# Patient Record
Sex: FEMALE | Race: BLACK OR AFRICAN AMERICAN | NOT HISPANIC OR LATINO | Employment: FULL TIME | URBAN - METROPOLITAN AREA
[De-identification: names, ages, dates, MRNs, and addresses within clinical notes are randomized per-mention and may not be internally consistent; named-entity substitution may affect disease eponyms.]

---

## 2018-03-21 ENCOUNTER — ANESTHESIA EVENT (OUTPATIENT)
Dept: SURGERY | Facility: HOSPITAL | Age: 48
End: 2018-03-21
Payer: COMMERCIAL

## 2018-03-21 ENCOUNTER — HOSPITAL ENCOUNTER (OUTPATIENT)
Facility: HOSPITAL | Age: 48
Discharge: HOME | End: 2018-03-21
Attending: OPHTHALMOLOGY | Admitting: OPHTHALMOLOGY
Payer: COMMERCIAL

## 2018-03-21 ENCOUNTER — OFFICE VISIT (OUTPATIENT)
Dept: OPHTHALMOLOGY | Facility: CLINIC | Age: 48
End: 2018-03-21
Payer: COMMERCIAL

## 2018-03-21 ENCOUNTER — ANESTHESIA (OUTPATIENT)
Dept: SURGERY | Facility: HOSPITAL | Age: 48
End: 2018-03-21
Payer: COMMERCIAL

## 2018-03-21 ENCOUNTER — TELEPHONE (OUTPATIENT)
Dept: OPHTHALMOLOGY | Facility: CLINIC | Age: 48
End: 2018-03-21

## 2018-03-21 VITALS
RESPIRATION RATE: 18 BRPM | TEMPERATURE: 99 F | HEIGHT: 61 IN | SYSTOLIC BLOOD PRESSURE: 139 MMHG | OXYGEN SATURATION: 100 % | WEIGHT: 149 LBS | HEART RATE: 74 BPM | DIASTOLIC BLOOD PRESSURE: 86 MMHG | BODY MASS INDEX: 28.13 KG/M2

## 2018-03-21 DIAGNOSIS — H33.011 RETINAL DETACHMENT OF RIGHT EYE WITH SINGLE BREAK: ICD-10-CM

## 2018-03-21 DIAGNOSIS — H33.011 RETINAL DETACHMENT OF RIGHT EYE WITH SINGLE BREAK: Primary | ICD-10-CM

## 2018-03-21 DIAGNOSIS — H27.01 APHAKIA, RIGHT: ICD-10-CM

## 2018-03-21 DIAGNOSIS — H53.001 AMBLYOPIA, RIGHT: ICD-10-CM

## 2018-03-21 DIAGNOSIS — H40.051 OCULAR HYPERTENSION, RIGHT: ICD-10-CM

## 2018-03-21 DIAGNOSIS — H33.001 RHEGMATOGENOUS RETINAL DETACHMENT OF RIGHT EYE: Primary | ICD-10-CM

## 2018-03-21 LAB
B-HCG UR QL: NEGATIVE
CTP QC/QA: YES

## 2018-03-21 PROCEDURE — D9220A PRA ANESTHESIA: Mod: CRNA,,, | Performed by: NURSE ANESTHETIST, CERTIFIED REGISTERED

## 2018-03-21 PROCEDURE — C1784 OCULAR DEV, INTRAOP, DET RET: HCPCS | Performed by: OPHTHALMOLOGY

## 2018-03-21 PROCEDURE — 92020 GONIOSCOPY: CPT | Mod: S$GLB,,, | Performed by: OPHTHALMOLOGY

## 2018-03-21 PROCEDURE — 25000003 PHARM REV CODE 250: Performed by: OPHTHALMOLOGY

## 2018-03-21 PROCEDURE — 25000003 PHARM REV CODE 250

## 2018-03-21 PROCEDURE — 99999 PR PBB SHADOW E&M-EST. PATIENT-LVL II: CPT | Mod: PBBFAC,,, | Performed by: OPHTHALMOLOGY

## 2018-03-21 PROCEDURE — 67108 REPAIR DETACHED RETINA: CPT | Mod: RT,,, | Performed by: OPHTHALMOLOGY

## 2018-03-21 PROCEDURE — D9220A PRA ANESTHESIA: Mod: ANES,,, | Performed by: ANESTHESIOLOGY

## 2018-03-21 PROCEDURE — 37000008 HC ANESTHESIA 1ST 15 MINUTES: Performed by: OPHTHALMOLOGY

## 2018-03-21 PROCEDURE — 36000706: Performed by: OPHTHALMOLOGY

## 2018-03-21 PROCEDURE — 27201423 OPTIME MED/SURG SUP & DEVICES STERILE SUPPLY: Performed by: OPHTHALMOLOGY

## 2018-03-21 PROCEDURE — 63600175 PHARM REV CODE 636 W HCPCS: Performed by: NURSE ANESTHETIST, CERTIFIED REGISTERED

## 2018-03-21 PROCEDURE — 81025 URINE PREGNANCY TEST: CPT | Performed by: OPHTHALMOLOGY

## 2018-03-21 PROCEDURE — 71000015 HC POSTOP RECOV 1ST HR: Performed by: OPHTHALMOLOGY

## 2018-03-21 PROCEDURE — 92225 PR SPECIAL EYE EXAM, INITIAL: CPT | Mod: RT,S$GLB,, | Performed by: OPHTHALMOLOGY

## 2018-03-21 PROCEDURE — 99499 UNLISTED E&M SERVICE: CPT | Mod: ,,, | Performed by: OPHTHALMOLOGY

## 2018-03-21 PROCEDURE — 27600004 OPTIME MED/SURG SUP & DEVICES INTRAOCULAR LENS: Performed by: OPHTHALMOLOGY

## 2018-03-21 PROCEDURE — 92004 COMPRE OPH EXAM NEW PT 1/>: CPT | Mod: S$GLB,,, | Performed by: OPHTHALMOLOGY

## 2018-03-21 PROCEDURE — 37000009 HC ANESTHESIA EA ADD 15 MINS: Performed by: OPHTHALMOLOGY

## 2018-03-21 PROCEDURE — 36000707: Performed by: OPHTHALMOLOGY

## 2018-03-21 PROCEDURE — S0020 INJECTION, BUPIVICAINE HYDRO: HCPCS | Performed by: OPHTHALMOLOGY

## 2018-03-21 PROCEDURE — 63600175 PHARM REV CODE 636 W HCPCS: Performed by: OPHTHALMOLOGY

## 2018-03-21 RX ORDER — MOXIFLOXACIN 5 MG/ML
1 SOLUTION/ DROPS OPHTHALMIC
Status: DISCONTINUED | OUTPATIENT
Start: 2018-03-21 | End: 2018-03-21 | Stop reason: HOSPADM

## 2018-03-21 RX ORDER — CYCLOPENTOLATE HYDROCHLORIDE 10 MG/ML
1 SOLUTION/ DROPS OPHTHALMIC
Status: DISCONTINUED | OUTPATIENT
Start: 2018-03-21 | End: 2018-03-21 | Stop reason: HOSPADM

## 2018-03-21 RX ORDER — NAPROXEN 500 MG/1
500 TABLET ORAL 2 TIMES DAILY
Refills: 3 | COMMUNITY
Start: 2018-02-05 | End: 2022-03-30 | Stop reason: CLARIF

## 2018-03-21 RX ORDER — LIDOCAINE HYDROCHLORIDE 20 MG/ML
INJECTION, SOLUTION EPIDURAL; INFILTRATION; INTRACAUDAL; PERINEURAL
Status: DISCONTINUED
Start: 2018-03-21 | End: 2018-03-21 | Stop reason: HOSPADM

## 2018-03-21 RX ORDER — DEXAMETHASONE SODIUM PHOSPHATE 4 MG/ML
INJECTION, SOLUTION INTRA-ARTICULAR; INTRALESIONAL; INTRAMUSCULAR; INTRAVENOUS; SOFT TISSUE
Status: DISCONTINUED
Start: 2018-03-21 | End: 2018-03-21 | Stop reason: HOSPADM

## 2018-03-21 RX ORDER — TROPICAMIDE 10 MG/ML
1 SOLUTION/ DROPS OPHTHALMIC
Status: CANCELLED | OUTPATIENT
Start: 2018-03-21

## 2018-03-21 RX ORDER — TIMOLOL MALEATE 5 MG/ML
SOLUTION/ DROPS OPHTHALMIC
Refills: 0 | COMMUNITY
Start: 2018-03-20 | End: 2022-03-31

## 2018-03-21 RX ORDER — PREDNISOLONE ACETATE 10 MG/ML
SUSPENSION/ DROPS OPHTHALMIC
Status: COMPLETED
Start: 2018-03-21 | End: 2018-03-21

## 2018-03-21 RX ORDER — NEOMYCIN SULFATE, POLYMYXIN B SULFATE, AND DEXAMETHASONE 3.5; 10000; 1 MG/G; [USP'U]/G; MG/G
OINTMENT OPHTHALMIC
Status: DISCONTINUED | OUTPATIENT
Start: 2018-03-21 | End: 2018-03-21 | Stop reason: HOSPADM

## 2018-03-21 RX ORDER — SODIUM CHLORIDE 9 MG/ML
INJECTION, SOLUTION INTRAVENOUS CONTINUOUS
Status: DISCONTINUED | OUTPATIENT
Start: 2018-03-21 | End: 2018-03-21 | Stop reason: HOSPADM

## 2018-03-21 RX ORDER — ACETAMINOPHEN 325 MG/1
650 TABLET ORAL EVERY 4 HOURS PRN
Status: DISCONTINUED | OUTPATIENT
Start: 2018-03-21 | End: 2018-03-21 | Stop reason: HOSPADM

## 2018-03-21 RX ORDER — LIDOCAINE HYDROCHLORIDE 20 MG/ML
INJECTION, SOLUTION EPIDURAL; INFILTRATION; INTRACAUDAL; PERINEURAL
Status: DISCONTINUED | OUTPATIENT
Start: 2018-03-21 | End: 2018-03-21 | Stop reason: HOSPADM

## 2018-03-21 RX ORDER — NEOMYCIN SULFATE, POLYMYXIN B SULFATE, AND DEXAMETHASONE 3.5; 10000; 1 MG/G; [USP'U]/G; MG/G
OINTMENT OPHTHALMIC
Status: DISCONTINUED
Start: 2018-03-21 | End: 2018-03-21 | Stop reason: HOSPADM

## 2018-03-21 RX ORDER — ONDANSETRON 4 MG/1
4 TABLET, FILM COATED ORAL EVERY 8 HOURS PRN
Qty: 12 TABLET | Refills: 0 | Status: SHIPPED | OUTPATIENT
Start: 2018-03-21 | End: 2018-03-29

## 2018-03-21 RX ORDER — FENTANYL CITRATE 50 UG/ML
INJECTION, SOLUTION INTRAMUSCULAR; INTRAVENOUS
Status: DISCONTINUED | OUTPATIENT
Start: 2018-03-21 | End: 2018-03-21

## 2018-03-21 RX ORDER — OSELTAMIVIR PHOSPHATE 75 MG/1
CAPSULE ORAL
Refills: 0 | COMMUNITY
Start: 2018-02-07 | End: 2022-03-30 | Stop reason: CLARIF

## 2018-03-21 RX ORDER — ONDANSETRON 2 MG/ML
INJECTION INTRAMUSCULAR; INTRAVENOUS
Status: DISCONTINUED | OUTPATIENT
Start: 2018-03-21 | End: 2018-03-21

## 2018-03-21 RX ORDER — TETRACAINE HYDROCHLORIDE 5 MG/ML
1 SOLUTION OPHTHALMIC
Status: DISCONTINUED | OUTPATIENT
Start: 2018-03-21 | End: 2018-03-21 | Stop reason: HOSPADM

## 2018-03-21 RX ORDER — EPINEPHRINE 1 MG/ML
INJECTION, SOLUTION INTRACARDIAC; INTRAMUSCULAR; INTRAVENOUS; SUBCUTANEOUS
Status: DISCONTINUED | OUTPATIENT
Start: 2018-03-21 | End: 2018-03-21 | Stop reason: HOSPADM

## 2018-03-21 RX ORDER — PHENYLEPHRINE HYDROCHLORIDE 25 MG/ML
1 SOLUTION/ DROPS OPHTHALMIC
Status: CANCELLED | OUTPATIENT
Start: 2018-03-21

## 2018-03-21 RX ORDER — PREDNISOLONE ACETATE 10 MG/ML
1 SUSPENSION/ DROPS OPHTHALMIC
Status: DISCONTINUED | OUTPATIENT
Start: 2018-03-21 | End: 2018-03-21 | Stop reason: HOSPADM

## 2018-03-21 RX ORDER — BUPIVACAINE HYDROCHLORIDE 7.5 MG/ML
INJECTION, SOLUTION EPIDURAL; RETROBULBAR
Status: DISCONTINUED | OUTPATIENT
Start: 2018-03-21 | End: 2018-03-21 | Stop reason: HOSPADM

## 2018-03-21 RX ORDER — OXYCODONE AND ACETAMINOPHEN 5; 325 MG/1; MG/1
1 TABLET ORAL EVERY 6 HOURS PRN
Qty: 12 TABLET | Refills: 0 | Status: SHIPPED | OUTPATIENT
Start: 2018-03-21 | End: 2018-03-29

## 2018-03-21 RX ORDER — DEXAMETHASONE SODIUM PHOSPHATE 4 MG/ML
INJECTION, SOLUTION INTRA-ARTICULAR; INTRALESIONAL; INTRAMUSCULAR; INTRAVENOUS; SOFT TISSUE
Status: DISCONTINUED | OUTPATIENT
Start: 2018-03-21 | End: 2018-03-21 | Stop reason: HOSPADM

## 2018-03-21 RX ORDER — ONDANSETRON 8 MG/1
8 TABLET, ORALLY DISINTEGRATING ORAL EVERY 8 HOURS PRN
Status: DISCONTINUED | OUTPATIENT
Start: 2018-03-21 | End: 2018-03-21 | Stop reason: HOSPADM

## 2018-03-21 RX ORDER — LIDOCAINE HCL/PF 100 MG/5ML
SYRINGE (ML) INTRAVENOUS
Status: DISCONTINUED | OUTPATIENT
Start: 2018-03-21 | End: 2018-03-21

## 2018-03-21 RX ORDER — TROPICAMIDE 10 MG/ML
1 SOLUTION/ DROPS OPHTHALMIC
Status: DISCONTINUED | OUTPATIENT
Start: 2018-03-21 | End: 2018-03-21 | Stop reason: HOSPADM

## 2018-03-21 RX ORDER — LIDOCAINE HYDROCHLORIDE 10 MG/ML
1 INJECTION, SOLUTION EPIDURAL; INFILTRATION; INTRACAUDAL; PERINEURAL ONCE
Status: COMPLETED | OUTPATIENT
Start: 2018-03-21 | End: 2018-03-21

## 2018-03-21 RX ORDER — PROPOFOL 10 MG/ML
VIAL (ML) INTRAVENOUS
Status: DISCONTINUED | OUTPATIENT
Start: 2018-03-21 | End: 2018-03-21

## 2018-03-21 RX ORDER — CYCLOPENTOLATE HYDROCHLORIDE 10 MG/ML
1 SOLUTION/ DROPS OPHTHALMIC
Status: CANCELLED | OUTPATIENT
Start: 2018-03-21

## 2018-03-21 RX ORDER — VANCOMYCIN HYDROCHLORIDE 500 MG/10ML
INJECTION, POWDER, LYOPHILIZED, FOR SOLUTION INTRAVENOUS
Status: DISCONTINUED | OUTPATIENT
Start: 2018-03-21 | End: 2018-03-21 | Stop reason: HOSPADM

## 2018-03-21 RX ORDER — PHENYLEPHRINE HYDROCHLORIDE 25 MG/ML
SOLUTION/ DROPS OPHTHALMIC
Status: COMPLETED
Start: 2018-03-21 | End: 2018-03-21

## 2018-03-21 RX ORDER — SODIUM CHLORIDE 0.9 % (FLUSH) 0.9 %
3 SYRINGE (ML) INJECTION
Status: DISCONTINUED | OUTPATIENT
Start: 2018-03-21 | End: 2018-03-21 | Stop reason: HOSPADM

## 2018-03-21 RX ORDER — BUPIVACAINE HYDROCHLORIDE 7.5 MG/ML
INJECTION, SOLUTION EPIDURAL; RETROBULBAR
Status: DISCONTINUED
Start: 2018-03-21 | End: 2018-03-21 | Stop reason: HOSPADM

## 2018-03-21 RX ORDER — PHENYLEPHRINE HYDROCHLORIDE 25 MG/ML
1 SOLUTION/ DROPS OPHTHALMIC
Status: DISCONTINUED | OUTPATIENT
Start: 2018-03-21 | End: 2018-03-21 | Stop reason: HOSPADM

## 2018-03-21 RX ORDER — MOXIFLOXACIN 5 MG/ML
SOLUTION/ DROPS OPHTHALMIC
Status: COMPLETED
Start: 2018-03-21 | End: 2018-03-21

## 2018-03-21 RX ORDER — MOXIFLOXACIN 5 MG/ML
1 SOLUTION/ DROPS OPHTHALMIC
Status: CANCELLED | OUTPATIENT
Start: 2018-03-21

## 2018-03-21 RX ORDER — EPINEPHRINE 1 MG/ML
INJECTION, SOLUTION INTRACARDIAC; INTRAMUSCULAR; INTRAVENOUS; SUBCUTANEOUS
Status: DISCONTINUED
Start: 2018-03-21 | End: 2018-03-21 | Stop reason: HOSPADM

## 2018-03-21 RX ORDER — TETRACAINE HYDROCHLORIDE 5 MG/ML
SOLUTION OPHTHALMIC
Status: DISCONTINUED
Start: 2018-03-21 | End: 2018-03-21 | Stop reason: HOSPADM

## 2018-03-21 RX ORDER — MIDAZOLAM HYDROCHLORIDE 1 MG/ML
INJECTION, SOLUTION INTRAMUSCULAR; INTRAVENOUS
Status: DISCONTINUED | OUTPATIENT
Start: 2018-03-21 | End: 2018-03-21

## 2018-03-21 RX ORDER — CYCLOPENTOLATE HYDROCHLORIDE 10 MG/ML
SOLUTION/ DROPS OPHTHALMIC
Status: COMPLETED
Start: 2018-03-21 | End: 2018-03-21

## 2018-03-21 RX ORDER — TETRACAINE HYDROCHLORIDE 5 MG/ML
1 SOLUTION OPHTHALMIC
Status: CANCELLED | OUTPATIENT
Start: 2018-03-21

## 2018-03-21 RX ORDER — HYDROCODONE BITARTRATE AND ACETAMINOPHEN 5; 325 MG/1; MG/1
1 TABLET ORAL EVERY 4 HOURS PRN
Status: DISCONTINUED | OUTPATIENT
Start: 2018-03-21 | End: 2018-03-21 | Stop reason: HOSPADM

## 2018-03-21 RX ORDER — VANCOMYCIN HYDROCHLORIDE 500 MG/10ML
INJECTION, POWDER, LYOPHILIZED, FOR SOLUTION INTRAVENOUS
Status: DISCONTINUED
Start: 2018-03-21 | End: 2018-03-21 | Stop reason: HOSPADM

## 2018-03-21 RX ORDER — MEDROXYPROGESTERONE ACETATE 150 MG/ML
INJECTION, SUSPENSION INTRAMUSCULAR
Refills: 6 | COMMUNITY
Start: 2017-12-28 | End: 2022-03-30 | Stop reason: CLARIF

## 2018-03-21 RX ORDER — TROPICAMIDE 10 MG/ML
SOLUTION/ DROPS OPHTHALMIC
Status: COMPLETED
Start: 2018-03-21 | End: 2018-03-21

## 2018-03-21 RX ORDER — PREDNISOLONE ACETATE 10 MG/ML
1 SUSPENSION/ DROPS OPHTHALMIC
Status: CANCELLED | OUTPATIENT
Start: 2018-03-21

## 2018-03-21 RX ADMIN — TROPICAMIDE 1 DROP: 10 SOLUTION/ DROPS OPHTHALMIC at 01:03

## 2018-03-21 RX ADMIN — PHENYLEPHRINE HYDROCHLORIDE 1 DROP: 25 SOLUTION/ DROPS OPHTHALMIC at 01:03

## 2018-03-21 RX ADMIN — FENTANYL CITRATE 50 MCG: 50 INJECTION, SOLUTION INTRAMUSCULAR; INTRAVENOUS at 06:03

## 2018-03-21 RX ADMIN — PREDNISOLONE ACETATE 1 DROP: 10 SUSPENSION/ DROPS OPHTHALMIC at 01:03

## 2018-03-21 RX ADMIN — CYCLOPENTOLATE HYDROCHLORIDE 1 DROP: 10 SOLUTION/ DROPS OPHTHALMIC at 01:03

## 2018-03-21 RX ADMIN — PROPOFOL 70 MG: 10 INJECTION, EMULSION INTRAVENOUS at 06:03

## 2018-03-21 RX ADMIN — MOXIFLOXACIN HYDROCHLORIDE 1 DROP: 5 SOLUTION/ DROPS OPHTHALMIC at 01:03

## 2018-03-21 RX ADMIN — TETRACAINE HYDROCHLORIDE 1 DROP: 5 SOLUTION OPHTHALMIC at 01:03

## 2018-03-21 RX ADMIN — SODIUM CHLORIDE: 0.9 INJECTION, SOLUTION INTRAVENOUS at 01:03

## 2018-03-21 RX ADMIN — MOXIFLOXACIN 1 DROP: 5 SOLUTION/ DROPS OPHTHALMIC at 01:03

## 2018-03-21 RX ADMIN — ONDANSETRON 4 MG: 2 INJECTION INTRAMUSCULAR; INTRAVENOUS at 06:03

## 2018-03-21 RX ADMIN — PREDNISOLONE ACETATE 1 DROP: 10 SUSPENSION/ DROPS OPHTHALMIC at 02:03

## 2018-03-21 RX ADMIN — MIDAZOLAM HYDROCHLORIDE 2 MG: 1 INJECTION, SOLUTION INTRAMUSCULAR; INTRAVENOUS at 06:03

## 2018-03-21 RX ADMIN — MOXIFLOXACIN 1 DROP: 5 SOLUTION/ DROPS OPHTHALMIC at 02:03

## 2018-03-21 RX ADMIN — CYCLOPENTOLATE HYDROCHLORIDE 1 DROP: 10 SOLUTION/ DROPS OPHTHALMIC at 02:03

## 2018-03-21 RX ADMIN — FENTANYL CITRATE 25 MCG: 50 INJECTION, SOLUTION INTRAMUSCULAR; INTRAVENOUS at 07:03

## 2018-03-21 RX ADMIN — HOMATROPINE HYDROBROMIDE 1 DROP: 50 SOLUTION OPHTHALMIC at 01:03

## 2018-03-21 RX ADMIN — TROPICAMIDE 1 DROP: 10 SOLUTION/ DROPS OPHTHALMIC at 02:03

## 2018-03-21 RX ADMIN — LIDOCAINE HYDROCHLORIDE 0.1 MG: 10 INJECTION, SOLUTION EPIDURAL; INFILTRATION; INTRACAUDAL; PERINEURAL at 01:03

## 2018-03-21 RX ADMIN — PHENYLEPHRINE HYDROCHLORIDE 1 DROP: 25 SOLUTION/ DROPS OPHTHALMIC at 02:03

## 2018-03-21 RX ADMIN — LIDOCAINE HYDROCHLORIDE 50 MG: 20 INJECTION, SOLUTION INTRAVENOUS at 06:03

## 2018-03-21 NOTE — INTERVAL H&P NOTE
H&P completed on 3/21/18 has been reviewed, the patient has been examined and:  I concur with the findings and no changes have occurred since H&P was written.    Active Hospital Problems    Diagnosis  POA    Retinal detachment of right eye with single break [H33.011]  Yes      Resolved Hospital Problems    Diagnosis Date Resolved POA   No resolved problems to display.

## 2018-03-21 NOTE — ANESTHESIA PREPROCEDURE EVALUATION
03/21/2018  Shital Duong is a 48 y.o., female.  Pre-operative evaluation for Procedure(s) (LRB):  REPAIR-RETINA (VITRECTOMY) (Right)    Shital Duong is a 48 y.o. female     Patient Active Problem List   Diagnosis    Retinal detachment of right eye with single break    Aphakia, right    Amblyopia, right    Ocular hypertension, right       Review of patient's allergies indicates:   Allergen Reactions    Aspirin        No current facility-administered medications on file prior to encounter.      Current Outpatient Prescriptions on File Prior to Encounter   Medication Sig Dispense Refill    naproxen (NAPROSYN) 500 MG tablet Take 500 mg by mouth 2 (two) times daily.  3    timolol maleate 0.5% (TIMOPTIC) 0.5 % Drop INSTILL ONE GTT LISA BID  0    medroxyPROGESTERone (DEPO-PROVERA) 150 mg/mL Syrg INJECT 1 ML INTRAMUSCULARLY AS DIRECTED  6    norgestimate-ethinyl estradiol (ORTHO-CYCLEN) 0.25-35 mg-mcg per tablet Take 1 tablet by mouth once daily. 28 tablet 11    oseltamivir (TAMIFLU) 75 MG capsule TAKE 1 CAPSULE BY MOUTH TWICE A DAY FOR 5 DAYS  0    oxycodone-acetaminophen (PERCOCET) 5-325 mg per tablet   0       Past Surgical History:   Procedure Laterality Date    Gastric sleeve         Social History     Social History    Marital status:      Spouse name: N/A    Number of children: N/A    Years of education: N/A     Occupational History    Not on file.     Social History Main Topics    Smoking status: Never Smoker    Smokeless tobacco: Never Used    Alcohol use 1.8 oz/week     3 Shots of liquor per week    Drug use: Unknown    Sexual activity: Not on file     Other Topics Concern    Not on file     Social History Narrative    No narrative on file         Vital Signs Range (Last 24H):  Temp:  [36.9 °C (98.4 °F)]   Pulse:  [69]   Resp:  [16]   BP: (156)/(86)   SpO2:  [100 %]        CBC: No results for input(s): WBC, RBC, HGB, HCT, PLT, MCV, MCH, MCHC in the last 72 hours.    CMP: No results for input(s): NA, K, CL, CO2, BUN, CREATININE, GLU, MG, PHOS, CALCIUM, ALBUMIN, PROT, ALKPHOS, ALT, AST, BILITOT in the last 72 hours.    INR  No results for input(s): PT, INR, PROTIME, APTT in the last 72 hours.        Diagnostic Studies:      EKD Echo:      Anesthesia Evaluation    I have reviewed the Patient Summary Reports.    I have reviewed the Nursing Notes.   I have reviewed the Medications.     Review of Systems  Anesthesia Hx:  No problems with previous Anesthesia  Denies Family Hx of Anesthesia complications.   Denies Personal Hx of Anesthesia complications.   Cardiovascular:  Cardiovascular Normal     Pulmonary:  Pulmonary Normal    Hepatic/GI:   Denies GERD.        Physical Exam  General:  Well nourished    Airway/Jaw/Neck:  Airway Findings: Mouth Opening: Normal Tongue: Normal  Mallampati: II  TM Distance: 4 - 6 cm      Dental:  Dental Findings: In tact   Chest/Lungs:  Chest/Lungs Findings: Clear to auscultation, Normal Respiratory Rate     Heart/Vascular:  Heart Findings: Rate: Normal  Rhythm: Regular Rhythm  Sounds: Normal             Anesthesia Plan  Type of Anesthesia, risks & benefits discussed:  Anesthesia Type:  general  Patient's Preference:   Intra-op Monitoring Plan: standard ASA monitors  Intra-op Monitoring Plan Comments:   Post Op Pain Control Plan:   Post Op Pain Control Plan Comments:   Induction:   IV  Beta Blocker:  Patient is not currently on a Beta-Blocker (No further documentation required).       Informed Consent: Patient understands risks and agrees with Anesthesia plan.  Questions answered. Anesthesia consent signed with patient.  ASA Score: 2     Day of Surgery Review of History & Physical:    H&P update referred to the surgeon.         Ready For Surgery From Anesthesia Perspective.

## 2018-03-21 NOTE — PLAN OF CARE
Pt due to void for UPT.    Pt resting comfortably.    Call light in reach.    No questions or concerns at this time.

## 2018-03-21 NOTE — PROGRESS NOTES
"HPI     Concerns About Ocular Health    Additional comments: Poss RD OD           Comments   Patient states she started to have occasional flashes and floaters in OD along with a "pressure" feeling about 5 days now. Her IOP was 42 in OS when she saw an optometrist who started her on Timolol BID OD and referred her here. Currently, she still sees some flashes and floaters in OD with   some discomfort. Her va has always been poor in OD.    A/P    1. RRD OD  With HST at 10:00 and submacular fluid    Plan 25g PPV/EL/AFx/SF6 OD  Local MAC  LOC 45 min    Risks, benefits, and alternatives to treatment discussed in detail with the patient.  The patient voiced understanding and wished to proceed with the procedure    2. Aphakia OD    3. 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 Amblyopia OD  Fairly dense given early onset of Va loss  But recent decline is noticeable in peripheral vision      To OR    I have personally interviewed the patient, reviewed the history and examined the patient and agree with the  exam, assessment and plan.      "

## 2018-03-21 NOTE — H&P
Pre-Operative History & Physical  Ophthalmology      SUBJECTIVE:     History of Present Illness:  Patient is a 48 y.o. female presents with No admission diagnoses are documented for this encounter..    MEDICATIONS:   (Not in a hospital admission)    ALLERGIES:   Review of patient's allergies indicates:   Allergen Reactions    Aspirin        PAST MEDICAL HISTORY: History reviewed. No pertinent past medical history.  PAST SURGICAL HISTORY:   Past Surgical History:   Procedure Laterality Date    Gastric sleeve       PAST FAMILY HISTORY:   Family History   Problem Relation Age of Onset    Breast cancer Neg Hx     Colon cancer Neg Hx     Ovarian cancer Neg Hx      SOCIAL HISTORY:   Social History   Substance Use Topics    Smoking status: Never Smoker    Smokeless tobacco: Not on file    Alcohol use Not on file        MENTAL STATUS: Alert    REVIEW OF SYSTEMS: Negative    OBJECTIVE:     Vital Signs (Most Recent)       Physical Exam:  General: NAD  HEENT: Atraumatic  Lungs: Adequate respirations, LCTAB  Heart: RRR, No murmur  Abdomen: Soft NT    ASSESSMENT/PLAN:     Patient is a 48 y.o. female with No admission diagnoses are documented for this encounter..     - Plan for surgical correction Plan 25g PPV/EL/AFx/SF6 OD  Local MAC  LOC 45 min     - Risks/benefits/alternatives of the procedure including, but not limited to scarring, bleeding, infection, loss or decreased vision, and/or need for possible repeat surgery discussed with the patient and family.   - Informed consent obtained prior to surgery and the patient/family voiced good understanding.    Tony Lua  3/21/2018  1:15 PM

## 2018-03-22 ENCOUNTER — OFFICE VISIT (OUTPATIENT)
Dept: OPHTHALMOLOGY | Facility: CLINIC | Age: 48
End: 2018-03-22
Payer: COMMERCIAL

## 2018-03-22 VITALS — DIASTOLIC BLOOD PRESSURE: 82 MMHG | HEART RATE: 73 BPM | SYSTOLIC BLOOD PRESSURE: 125 MMHG

## 2018-03-22 DIAGNOSIS — H27.01 APHAKIA, RIGHT: ICD-10-CM

## 2018-03-22 DIAGNOSIS — H33.011 RETINAL DETACHMENT OF RIGHT EYE WITH SINGLE BREAK: Primary | ICD-10-CM

## 2018-03-22 PROCEDURE — 99024 POSTOP FOLLOW-UP VISIT: CPT | Mod: S$GLB,,, | Performed by: OPHTHALMOLOGY

## 2018-03-22 PROCEDURE — 99999 PR PBB SHADOW E&M-EST. PATIENT-LVL III: CPT | Mod: PBBFAC,,, | Performed by: OPHTHALMOLOGY

## 2018-03-22 NOTE — OP NOTE
DATE OF PROCEDURE:  03/21/2018    PREOPERATIVE DIAGNOSIS:  Rhegmatogenous retinal detachment to the right eye.    POSTOPERATIVE DIAGNOSIS:  Rhegmatogenous retinal detachment to the right eye.    PROCEDURE PERFORMED:  A 25-gauge pars plana vitrectomy with peripheral   iridectomy, endolaser, air-fluid exchange, injection of SF6 gas 20% to the right   eye.    ENDOLASER PARAMETERS:  Number of spots 1407, power 150 milliwatts, duration 0.1   second.    ATTENDING SURGEON:  KARLA Wilson M.D.    ASSISTANT SURGEON:  Fellow, Tony Lua.    ANESTHESIA:  Local monitored anesthesia care with a retrobulbar injection of 4.0   mL mixture of 0.75% Marcaine, 2% Xylocaine.    ESTIMATED BLOOD LOSS:  Minimal.    COMPLICATIONS:  None.    DISPOSITION:  Stable to recovery.    INDICATIONS FOR SURGERY:  This is a 48-year-old female with a history of   traumatic injury to her right eye at 2 years of age.  She underwent traumatic   cataract extraction at that time and a subsequent procedure when she was 10 to   remove residual lens material.  She had dense amblyopia and poor vision.    However, she noticed some of her peripheral vision worsening over the last 3 to   4 days.  She went to an outside ophthalmologist who noted high pressure and   possible retinal detachment and was referred to a retina specialist.  However,   there was some concern over care coordination and the patient requested the care   to be transferred to [facility] where we had both in-house glaucoma and retina.     ____ evaluated the patient urgently this morning and found a retinal   detachment while I was at surgery with elevated pressure and we decided to take   the patient for prompt repair of the retinal detachment.  Risks, benefits, and   alternatives of surgery were discussed in detail.  Risks including loss of   vision, loss of eye, recurrent retinal detachment, infection, hemorrhage,   glaucoma, hypotony, ptosis, and diplopia.  The patient voiced  understanding and   wished to proceed with the surgery.    DESCRIPTION OF PROCEDURE:  After proper informed consent was obtained, the   patient was brought back to the Operating Suite at [facility] where   MAC anesthesia was induced.  Retrobulbar injection was provided as above without   complication.  The patient was prepped and draped in the normal sterile fashion   for ophthalmic surgery and a lid speculum was placed in the right eye.  A   standard three-port 25-gauge pars plana vitrectomy was set up with the infusion   cannula inserted 3.5 mm posterior to the limbus.  The infusion cannula was   turned on only after it was observed to be free and clear of all underlying   retinal tissue.  Supranasal and supratemporal trocars were also placed 3.5 mm   posterior to the limbus.  The vitrector and light pipe were introduced in the   vitreous cavity and a core vitrectomy was performed.  The supratemporal   detachment was noted with the presence of submacular fluid.  The retinal break   was isolated and the vitreous was trimmed around the break.  There were several   small aphakic-like breaks around that tear.  Scleral depression was performed in   360 degrees to help with removal of the cortical vitreous.  No additional   retinal breaks were found in other quadrants, although there were some areas of   thinning of the retina noted.  The breaks were marked with diathermy and a   posterior retinotomy was created.  A peripheral iridectomy was created with the   vitrector inferonasally.  Then, an air-fluid exchange was performed.  The retina   flattened nicely following this maneuver.  Endolaser was applied in a barrier   fashion around the retinal breaks, the posterior retinotomy, and in a 360-degree   equatorial fashion given the patient's aphakic status.  The supranasal trocar   was removed and not leaking after gentle massage.  A 20% SF6 gas mixture was   created, approximately 40 mL were cycled through  the eye and the supratemporal   and infusion trocars were removed and not leaking after gentle massage.  The eye   was normal pressure via palpation.  Subconjunctival injections of vancomycin   and Decadron were given to the patient.  The drapes were removed from the   patient.  She was washed free of Betadine prep solution.  Maxitrol ointment was   placed in the right eye.  The eye was patch shielded.  MAC anesthesia was   reversed and she was brought to Recovery Room in a stable condition, tolerating   the procedure well, and Dr. Wilson was present for the entire case.      ASIA/ARIELLE  dd: 03/21/2018 19:48:24 (CDT)  td: 03/21/2018 23:54:43 (CDT)  Doc ID   #[id number]  Job ID #[id number]    CC:

## 2018-03-22 NOTE — ANESTHESIA POSTPROCEDURE EVALUATION
"Anesthesia Post Evaluation    Patient: Shital Duong    Procedure(s) Performed: Procedure(s) (LRB):  REPAIR-RETINA (VITRECTOMY) (Right)    Final Anesthesia Type: MAC  Patient location during evaluation: PACU  Patient participation: Yes- Able to Participate  Level of consciousness: awake and alert  Post-procedure vital signs: reviewed and stable  Pain management: adequate  Airway patency: patent  PONV status at discharge: No PONV  Anesthetic complications: no      Cardiovascular status: blood pressure returned to baseline  Respiratory status: unassisted  Hydration status: euvolemic  Follow-up not needed.        Visit Vitals  /86   Pulse 74   Temp 37 °C (98.6 °F) (Skin)   Resp 18   Ht 5' 1" (1.549 m)   Wt 67.6 kg (149 lb)   SpO2 100%   Breastfeeding? No   BMI 28.15 kg/m²       Pain/Tatiana Score: Pain Assessment Performed: Yes (3/21/2018  7:45 PM)  Presence of Pain: denies (3/21/2018  7:45 PM)  Tatiana Score: 10 (3/21/2018  7:45 PM)      "

## 2018-03-22 NOTE — DISCHARGE INSTRUCTIONS
Post Op Instructions:  Patient should Maintain Eye shield & Dressing until seen tomorrow in eye clinic  Tylenol as needed for general discomfort  Use Prescription for pain medication if pain is severe  Use Prescription for Nausea (Zofran) if nausea or vomiting  No excessive exercise   No Bending, Lifting or Straining  Call MD if significant pain or nausea / vomiting uncontrolled by medications  Call MD if temperature in excess of 101' F  Maintain Head in a Face-Down Position  Return to eye clinic for Post Op Examination tomorrow Morning.  Bring Medicine bag to tomorrow's appointment.        Having a Vitrectomy    A vitrectomy is a type of eye surgery to treat problems with the retina and vitreous. The vitreous is a gel-like substance that fills the middle portion of your eye. During the surgery, your surgeon removes the vitreous and replaces it with another solution.  What to tell your health care provider  Before your surgery, tell your health care provider:  · What medicines you take. This includes over-the-counter medicines such as ibuprofen. It also includes vitamins, herbs, and other supplements. You may need to stop taking some medicines before the procedure, such as blood thinners and aspirin.  · If you smoke. You may need to stop before your surgery. Smoking can delay healing. Talk with your healthcare provider if you need help to stop smoking.  · If youve had recent changes in your health. This includes an infection or fever.  · If you are sensitive or allergic to anything. This includes medicines, latex, tape, and anesthetic medicines.  · If you are pregnant. Also tell your healthcare provider if you think you may be pregnant.  Getting ready for your surgery  Make sure to:  · Ask a family member or friend to take you home from the hospital  · Make plans for some help at home while you recover  · Follow all other instructions from your health care provider  · Read the consent form and ask questions if  something is not clear  · Not eat or drink after midnight before your surgery  Tests before your surgery  Before your surgery, your doctor may look at your retina. Special tools are used to shine a light in your eye and look at your retina. You may need to have your eyes dilated for this eye exam. You also may need to have an ultrasound of your eye. This helps your doctor view the retina. An ultrasound uses sound waves to create images on a computer screen.  On the day of your surgery  Talk with your doctor about what to expect during your surgery. The details of the surgery may differ somewhat. A doctor specially trained in eye surgery (ophthalmologist) will do your operation. In general, you can expect the following:  · You may have general anesthesia. This will cause you to sleep through the surgery. Or you may be awake during the surgery. You will receive a medicine to help you relax. You also may be given anesthetic eye drops and injections. This is to make sure you dont feel anything.  · Your surgeon will make an incision in the outer layer of your eye.  · He or she will make a small cut in the white part of the eye (sclera).  · Your doctor will remove the vitreous and any scar tissue or other material.  · Your doctor will do other repairs to your eye as needed. For example, he or she might use a laser to fix a tear in your retina. In some cases, your doctor may inject a gas bubble into your eye. This is to help keep the retina in place.  · Your doctor will replace the vitreous with another type of fluid. It may be replaced with silicone oil or saline.  · Your doctor will close your incisions with stitches.  · Your doctor will put an antibiotic ointment on your eye to help prevent infection.  · Your eye will be covered with a patch.  After your surgery  Youll likely be able to go home the same day. Have someone drive you home.  Recovering at home  Follow all of your doctors instructions about eye care. Your  eye may be a little sore after the procedure. You can take over-the-counter pain medicine as approved by your healthcare provider. You may need to use eye drops with antibiotics. This is to help prevent infection. You may need to wear an eye patch for a day or so.  If you had a gas bubble placed in your eye during your vitrectomy, you will need to follow specific instructions about positioning. You will also need to not travel on an airplane for a period of time after the surgery. Ask your doctor when it will be safe for you to fly again.  Follow-up care  You will need close follow-up with your doctor to see how well the surgery worked. You may have an appointment the day after the surgery. You may need follow-up surgery in the future to remove the replacement fluid from your eye.  Your vision may not be completely normal after your vitrectomy, especially if you had permanent damage to your retina. Ask your doctor how much improvement you can expect.     When to call your health care provider  Call your health care provider right away if you have any of the below:  · Vision that gets worse  · Pain or swelling around your eye that gets worse   Date Last Reviewed: 6/16/2015  © 1903-9421 [employer]. [address] [place] [address]. All rights reserved. This information is not intended as a substitute for professional medical care. Always follow your healthcare professional's instructions.      Recovery After Procedural Sedation (Adult)  You have been given medicine by vein to make you sleep during your surgery. This may have included both a pain medicine and sleeping medicine. Most of the effects have worn off. But you may still have some drowsiness for the next 6 to 8 hours.  Home care  Follow these guidelines when you get home:  · For the next 8 hours, you should be watched by a responsible adult. This person should make sure your condition is not getting worse.  · Don't drink any alcohol for  the next 24 hours.  · Don't drive, operate dangerous machinery, or make important business or personal decisions during the next 24 hours.  Note: Your healthcare provider may tell you not to take any medicine by mouth for pain or sleep in the next 4 hours. These medicines may react with the medicines you were given in the hospital. This could cause a much stronger response than usual.  Follow-up care  Follow up with your healthcare provider if you are not alert and back to your usual level of activity within 12 hours.  When to seek medical advice  Call your healthcare provider right away if any of these occur:  · Drowsiness gets worse  · Weakness or dizziness gets worse  · Repeated vomiting  · You can't be awakened   Date Last Reviewed: 10/18/2016  © 8107-1518 [employer]. [address] [place] [address], [place] [address]. All rights reserved. This information is not intended as a substitute for professional medical care. Always follow your healthcare professional's instructions.

## 2018-03-22 NOTE — BRIEF OP NOTE
3/3/2017      RE: Joshua Ennis  [address] [facility] [address]       [facility] Follow-Up Note  Date of visit: 3/3/17  Last visit date:  11/18/2016    Joshua Ennis MRN# [id number]   Age: 67 year old YOB: 1949          Assessment and Plan:     Assessment:   Mr. 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  He was initiated on cyclophosphamide 150 mg QD and high-dose prednisone 3/2013. He last took prednisone in 10/2013. Cytoxan was switched to MTX for maintenance treatment in 10/2013.  He has been tolerating it well. He had a flare in 12/2013 with increased crusts in his nose along with recurrence of arthralgia, worsening numbness in feet and increasing vasculitis marker. Renal function was stable with negative repeat chest CT. His vasculitis flare was treated with short course of prednisone taper 20 mg po qd max and increasing MTX to 8 tab = 20 mg qwk. Vasculitis symptoms improved.  At visit on 1/2015, he had scabs in his nose, had cold dakota symptoms and increased arthralgia/fatigue (shoulders, L hand). Labs from 12/16 showed increased Cr level and hematuria with rising PR3 (more than 8), there was a concern for vasculitis flare (in kidneys, sinuses), no flare on repeat chest CT 1/2015. Therefore it was recommended to try rituximab. Another reason was to be able to taper MTX off given abnormal Cr.  He is now s/p Rituximab infusion x 4 (1st on 2/25/2015). He is feeling well. No hematuria with stable Cr, no SOB. Saw ENT with negative nasal mucosa biopsy 6/2015 for granuloma but showed active inflammation, had left nostril lesion which decreased in size by use of mupirocin ointment. PR3 vasculitis marker went back to NL in 6/2015, it was NL in  Pre-Op Dx: RRD OD    Post Op Dx: same    Procedure Performed: 25g PPV/PI/EL/AFx/SF6 20% OD  EL #1407, P 150-200mW, D 0.1s    Attending Surgeon: Katie    Assistant Surgeon: Chanell    Anesthesia: Local/Mac, retrobulbar injection of 4.0cc mixture 0.75%Marcaine, 2% Xylocaine    Estimated blood loss: Minimal    Complication: None    Specimen: None    Disposition: Stable to recovery    Findings/Outcome: ST RD with HST and multiple smaller breaks.  Barrier applied around ST breaks, ST posterior retinotomy and 360 retinopexy    Date of Discharge: 3/21/18    Discharge Disposition: stable to recovery then home    F/U: tomorrow     10/2015, given stable vasculitis and low GFR, MTX from 8 to 7 tab po qwk. Repeat [facility]-3 in 3/2016 was slightly positive, Cr was slightly higher than baseline. We tapered his MTX further to 6 tab po qwk in 2/2016 given lack of symptoms. Re-check labs in April were almost unchanged from 3/2016 but CRP was slightly up. Most recent labs on 2/7/17 with elevated CRP (40.6) but normal ESR (12) and ANCA 1:20 likely reflecting bacterial infection.  Patient is currently recovering from pneumonia but is otherwise stable from a vasculitis standpoint. He does not have any new complaints. He was found on physical exam today to have a small dark lesion on his left soft palate. Given history of melanoma, would recommend that this be further evaluated. He recently followed-up in dermatology for annual skin exam but this was not commented on. He also continues to have episodes of diplopia concerning for a central process given normal eye exam.      Plan:    - Decrease MTX to 12.5mg (5 tabs) po qwk, continue with Folic acid     > Will repeat MTX monitoring labs in 1 month     > Patient instructed to hold MTX if develops fevers or infection  - Refer to neurophthalmology for evaluation of diplopia (Dr. Robertson)  - Schedule appointment with Dr. Obando in ENT for 2nd opinion on mouth lesion   - Neuropathy unchanged on Gabapentin  - PPSV23 vaccine today  - RTC in 3-4 months    Patient was seen and discussed with Dr. Sanchez who agrees with the above plan.    Angelica Smith  Internal Medicine, PGY2  [phone]    Attending Note: I saw and evaluated the patient with Dr. Smith. I agree with the assessment and plan.    Ede Sanchez MD    Orders Placed This Encounter   Procedures     PNEUMOCOCCAL VACCINE,ADULT,SQ OR IM (PPSV23)     Albumin level     ALT     AST     CBC with platelets differential     Creatinine     CRP inflammation     Erythrocyte sedimentation rate auto     Routine UA with Micro Reflex to Culture     Protein  random urine      Creatinine random urine     Antineutrophil cytoplasmic Anju IgG     Vasculitis panel     OPHTHALMOLOGY ADULT REFERRAL             HPI / Interim History:      Mr. Ennis is a 65 yo WM with h/o melanoma s/p resection in 11/2011, former tobacco use, and GPA diagnosed in 3/2013. For details of GPA diagnosis, please refer to initial consult note. Briefly, he initially presented with constitutional sx of fever, sweating, weight loss, migratory arthralgia, numbness/shooting pain in feet to his PCP.  Had enlarged inguinal LNs. His PCP suspected malignancy and especially lung cancer given h/o tobacco use.  His work-up showed several lung nodules and CT guided biopsy of one of the nodules done in 2/13 showed granulomatous inflammation with no evidence of malignancy.  Inguinal LN biopsy done 3/13 showed inflammation with no malignancy.  He had not had any respiratory sx, SOB, CP, cough, hemoptysis or sinusitis.  He was hospitalized for further work up and was found having high PR3, neg MPO, neg ANCA.  He also had + RF and trace cryo.   Had microscopic hematuria with NL Cr at the time of admission but his Cr started to rise before discharge. Had abnormal LFTs toward the end of discharge(was trending down, liver US was unremarkable).  He was diagnosed with GPA given high titer of PR3 to 723, +granulomatous inflammation of the lung nodule, microscopic hematuria, and migratory arthralgia along with high ESR/CRP and constitutional sx.  He was Started on cytoxan 150 mg po qd, prednisone 70 mg po qd (after IV solumedrol 1 gr qd x 3 days), Bactrim DS three times a wk for PCP prophylaxis and fosamax 70 mg po qwk. He was also put on vit D 95579 units qwk and neurontin.  He was discharged on 3/13/2013.   At initial visit in Rheumatology clinic, patient was referred to nephrology for renal biopsy since Cr was up despite being on cytoxan and prednisone 75 qd.  Renal biopsy 3/13 confirmed Dx of GPA.  After discussion with Dr. Krishnamurthy, made  a decision to continue with current regimen and if no improvement to switch to IV cytoxan or rituximab.  His Cr initially increased to 1.71, but stabilized to ~ 1.3.  He has a h/o high BP and was recently started on losartan per nephology.  He has been seen by neurology for bilaterally foot and ankle numbness and was diagnosed with neuropathy possibly secondary to vasculitis.  He was also seen by oncology; there was no concern for malignancy.   Overall, the patient is doing well today with no specific complaints or concerns.  He had recent blood work 9/19 which showed negative inflammatory and vasculitis markers (PR3, MPO, ANCA, and CRP).  His SCr has remained elevated, but stable at 1.37.  He is at the tail end of a prednisone taper; currently taking 1 mg QD, set to d/c 10/15.  He is still on cyclophosphamide, taking 150 mg QD.  He was prescribed losartan at a f/u visit w/ nephrology 8/21, but states his BP have been running 130's/80's, so has not taken the medication for over a month.  Additionally, he had been taking neurontin for numbness in his feet, toes, and ankles, but stopped b/c he was not getting symptom relief.  Today, the patient denies vision changes, epistaxis, oral ulcerations, SOB, hemoptysis, CP, joint pain, abdominal pain, dysuria, or hematuria.  He does endorse a cough, but states that this has been intermittent, chronic, and non-productive.  Additionally, he endorses right shoulder discomfort that is worse w/ abduction, but has also been chronic. Of note, the patient remains a former smoker, having quit in February of this year.     His major complaint is increased numbness of his toes, but has resumed taking neurontin and feels sensation is coming back which is painful. No SOB, cough, crusts in nose or blood in urine. He has fatigue. He is off cytoxan since 10/2013, is on MTX 8tab po qwk. MTX was started with 4 tab po qwk in 10/2013, was increased to 8 tab after last visit in 1/2014. In  12/2013, was felt to have a small flare of vasculitis with increased PR3, crusts in nose and R shoulder pain; therefore prednisone taper with max dose of 20 mg qd was given along with increasing MTX to 7 tab qwk. MTX was later increased to 8 tab qwk in 1/2014. Labs done in 2/2014 showed rising PR3. He had ER visit on 1/8 for urosepsis (E. Coli), was treated with cedifir, recovered. Continues having shoulder pain, requests referral to PT.    1/2015: He reports having increased arthralgia (shoulders, L hand) x a month. About 2 wk ago, L hand pain was so bad that he could not move his hand. No major joint swelling. Has no AM stiffness. Reports being tested negative for lyme (local lab) about 2 wk ago. Appetite is worse. He is more tired. He sleeps a lot. Has a cold x 1 wk, no hemoptysis. Neuropathy is the same. No SOB. No fever. Reports having a rash under L axilla x last 2 wk, comes and goes but to him looks like a lyme rash. He thinks he probably had a tick bite.    Last visit 3/2015: Rituximab qwk x 4 doses was recommended at last visit given concern for active GPA. He had his 1st infusion on 2/25/2015, right after start of infusion, developed hives without any resp sx, was treated with extra dose of benadryl and solu cortef. Hives resolved. Infusion was resumed at a slower rate and he finished and tolerated it well. Had his 2nd infusion on 3/4/2015 with no reaction although this time I increased his solumedrol from 100 to 250 mg as part of pre-medication. He has since completed all 4 infusions w/o reaction. States that nasal and joint symptoms remain improved. He still does have some crusting in his left nostril. Does have some pain and swelling in the hands w/minimal morning stiffness but that is his baseline. Says pain in his shoulders has improved. He denies any medication side effects.    He was seen in the ED 4/3/15 for productive cough and fever. He was treated with Levaquin for likely pna. He is now feeling  "better with only lingering cough.    10/16/2015: He is feeling well, has no complaints except cough at night because of sinus drainage (chronic). Requests pneumonia shot.    2/2016: Today patient reports feeling in excellent condition. Denies chest symptoms, such as SOB, hemoptysis, cough, or nasal drainage/bleeding. He also denies any hematuria and his last renal labs were stable. Unfortunately, his neuropathy seems to be his biggest problem. Neurology started him on a low dose of gabapentin without improvement.     5/6/16: Besides stuffy nose due to seasonal allergies, has no complaints. No cough, SOB, hemoptysis, hematuria, weight loss or fatigue.    8/12/16: Patient has no complaints today. No cough, SOB, hemoptysis, hematuria, weight loss or fatigue. He was seen today in Nephrology clinic for CKD follow up, doing well, no further follow up is needed. Patient reports visual disturbance with decreased vision probably on the R eye (chelsietent does not remember) with decreased to none visual strength in low visual field with some \"blanks\" which lasted for 40 seconds. This is a second episode in the last 1.5-2 years. Patient also reports recurrent double vision occuring every 4-5 months. Last ophthalmologist appointment was 1986.     11/18/16: Feeling very well, no complaints. Is going to have an eye exam today.    3/3/17: Mr. Ennis says he is doing well overall. He is still recovering from a pneumonia diagnosed on 2/14/17. He says that he was feeling ill for about 2 weeks before this with worsening productive cough, nasal congestion and high fevers with \"fever blisters\". He went to the ED on 2/14/17 and received ceftriaxone and a Z-pack and says he has been improving since then. He says that many other people at his work were sick with similar symptoms. Today he reports some residual nasal congestion but denies shortness of breath. He says that during his illness he did have some blood in his nose \"if I picked at it\" " but he otherwise denies epistaxis, hemoptysis, hematuria and blood in his stools. His significant other is wondering if his methotrexate dose can start to be tapered down. Currently he is taking MTX 15mg weekly although this was held when he had pneumonia.    Mr. Ennis says that he continues to experience episodes of diplopia on average once per month. These episodes typically last 20-30 seconds. He was seen by ophthalmology on 11/18/16 and had a normal eye exam.         Past Medical History:     Past Medical History   Diagnosis Date     Hypertension      Malignant melanoma (H)      Malignant neoplasm (H)      melanoma     Squamous cell carcinoma (H)      Russo syndrome           Past Surgical History:     Past Surgical History   Procedure Laterality Date     Biopsy  11/2011     melanoma on back     Herniorrhaphy inguinal  8/6/2012     Procedure: HERNIORRHAPHY INGUINAL;  Open Repair Left Inguinal Hernia;  Surgeon: Jerad Baker MD;  Location: [facility]     Dissect lymph node inguinal  3/1/2013     Procedure: DISSECT LYMPH NODE INGUINAL;  Bilateral Inguinal Lymph Node Biopsy.;  Surgeon: Tariq Acosta MD;  Location: [facility]     Esophagoscopy, gastroscopy, duodenoscopy (egd), combined  7/5/2013     Procedure: COMBINED ESOPHAGOSCOPY, GASTROSCOPY, DUODENOSCOPY (EGD);  Gastroscopy;  Surgeon: Tariq Acosta MD;  Location: [facility]          Social History:     Social History   Substance Use Topics     Smoking status: Former Smoker     Packs/day: 1.00     Types: Cigarettes     Quit date: 2/14/2013     Smokeless tobacco: Never Used     Alcohol use Yes      Comment: rare          Family History:     Family History   Problem Relation Age of Onset     DIABETES Mother      CANCER Father      stomach     HEART DISEASE Father      HEART DISEASE Brother      DIABETES Sister      DIABETES Sister      DIABETES Sister      HEART DISEASE Brother      Glaucoma No family hx of      Macular Degeneration No family hx of         "   Immunizations:   Due for PPSV23, will administer today. Immunizations otherwise up to date.    PMHx, FHx, SHx were reviewed, unchanged.         Allergies:     Allergies   Allergen Reactions     Shrimp Nausea and Vomiting     Got sick each time he ate it          Medications:     Current Outpatient Prescriptions   Medication Sig     methotrexate 2.5 MG tablet CHEMO Take 5 tablets (12.5 mg) by mouth once a week     folic acid (FOLVITE) 1 MG tablet Take 2 tablets (2 mg) by mouth daily     gabapentin (NEURONTIN) 400 MG capsule Take 1 capsule (400 mg) by mouth At Bedtime     fluorouracil (EFUDEX) 5 % cream Apply twice daily to scalp for 2-4 weeks.     Acetaminophen (TYLENOL PO) Take 650 mg by mouth once as needed for mild pain or fever     losartan (COZAAR) 50 MG tablet Take 1 tablet (50 mg) by mouth daily     calcium-vitamin D (CALCIUM 600 + D) 600-400 MG-UNIT per tablet Take 1 tablet by mouth 2 times daily.     Cholecalciferol (VITAMIN D) 1000 UNITS capsule Take 1 capsule by mouth daily.     Blood Pressure Monitor KIT Automatic Blood Pressure Monitor     [DISCONTINUED] methotrexate 2.5 MG tablet Take 6 tablets (15 mg) by mouth once a week     [DISCONTINUED] gabapentin (NEURONTIN) 400 MG capsule Take 1 capsule (400 mg) by mouth At Bedtime     No current facility-administered medications for this visit.           Review of Systems:   A 10-point ROS was done. Positives are per HPI.         Physical Exam:     Vitals:    03/03/17 1128   BP: 147/78   Pulse: 68   SpO2: 98%   Weight: 84.4 kg (186 lb)   Height: 1.753 m (5' 9\")     Constitutional:   Awake, alert, cooperative, no apparent distress.   Eyes:   Pupils equal, round and reactive to light, sclera clear, conjunctiva normal.   ENT:   Normocephalic, atramatic,  oral pharynx with moist mucus membranes, tonsils without erythema or exudates, dentures in place. Small dark purple/black macule left soft palate.   Neck:   Supple, symmetrical, trachea midline, no adenopathy. "   Lungs:   No increased work of breathing, good air exchange, clear to auscultation bilaterally, no crackles or wheezing.    Cardiovascular:   Regular rate and rhythm, normal S1 and S2, no S3 or S4, and no murmur noted.   Musculoskeletal:   No active synovitis or joint tenderness. Good ROM in all joints tested. No edema in LE.   Neurologic:   AOx3; CN grossly intact.   Skin:   No rashes or ecchymoses on limited exam.          Data:   Recent laboratory data reviewed.    2/14/17  Cr 1.46, WBC 7.8, AST/ALT normal    2/7/17  CRP 40.6, ESR 12, ANCA 1:20, MPO neg, VT-3 neg    Recent imaging studies reviewed by me.    CXR (2/14/17)    New mild patchy opacity at the right lung base appears to  localize to the posterior right lower lobe base and could represent a  developing area of pneumonia. Left lung is clear.    CHEST CT (2/16/2013)    Chest: Multiple indeterminate pulmonary nodules. Several of the  larger lesions are cavitary. There are 3 dominant lesions, a 3.2 cm  cavitary lesion in the left upper lobe, a 3.4 cm solid mass in the  right lower lobe laterally and a 3.7 cm cavitary mass in the right  lower lobe medially. In addition there is mediastinal lymphadenopathy  with a dominant 3 cm subcarinal node. Bilateral axillary  lymphadenopathy with 2.4 cm node seen bilaterally. Chest otherwise  unremarkable.       Exam: High-resolution Chest CT without contrast 1/9/2015 12:42 PM.  History: Concern for ANCA vasculitis flare in the chest.  Comparison: 3/7/2014, 11/14/2013, 2/16/2013.  Technique: CT helical acquisition from the lung apices to the kidneys  was obtained without intravenous contrast. Both inspiratory and  expiratory images were obtained.    Findings:  Moderate atherosclerotic calcifications of the coronary arteries. Mild  calcifications involving the aorta and aortic great vessels. Prominent  paratracheal lymph node on series 3 image 22 measuring 9 mm. Decreased  from 2/16/2013 and unchanged from 3/7/2014.  Borderline enlarged right  hilar lymph node, unchanged from 3/7/2014 and decreased from  2/16/2013. Heart is not enlarged. Trace cardial effusion. No axillary  lymphadenopathy.  Nodular scarring in the left apex, unchanged from 2/16/2013. No  pleural effusion or pneumothorax. No evidence of intrapulmonary  infection. There is a cluster of tree in bud nodularity noted in the  anteromedial right upper lobe. These nodules appear new. Expiratory  images demonstrate mild air trapping. Scattered pulmonary nodules:  Series 6 image 146: Seen posteriorly in the right lower lobe, there is  a sub-pleural nodule measuring 1.6 x 1.2 cm with a spiculated border.  Previously, this nodule measured 2.0 x 1.6 cm.  Series 6 image 189: Seen laterally in the right lower lobe, there is a  former mid pulmonary nodule which is unchanged from 3/7/2013 and  decreased from 2/16/2013.  Series 6 image 169: Seen posterolaterally in the right lower lobe,  there is a 3 mm pulmonary nodule which is unchanged from 3/7/2014 and  decreased from 2/16/2013.  Series 6 image 225: Seen posterolaterally in the left lower lobe,  there is a 4 mm subpleural nodule which is unchanged from 2/16/2014.  Other scattered sub-4 mm pulmonary nodules appear stable from previous  study.  Evaluation of the upper abdomen is limited due to the lack of  intravenous contrast.  No suspicious bony lesions.    IMPRESSION  Impression:   1. Scattered pulmonary nodules enlarged lymph nodes are  stable/slightly decreased from the previous study. No evidence for  acute flare in patient's known ANCA-associated vasculitis.  2. New tree in bud nodularity seen anteromedially in the right middle  lobe. Findings could be seen in the setting of an atypical infectious  process.  3. Mild air trapping. Finding is nonspecific and is seen in setting of  small airways disease such as asthma or bronchiolitis.  I have personally reviewed the examination and initial interpretation  and I agree with  the findings.  TOÑO PERKINS MD    Component      Latest Ref Rng 6/11/2015 6/11/2015           9:26 AM  9:29 AM   Sodium      133 - 144 mmol/L  139   Potassium      3.4 - 5.3 mmol/L  4.4   Chloride      94 - 109 mmol/L  109   Carbon Dioxide      20 - 32 mmol/L  23   Anion Gap      3 - 14 mmol/L  7   Glucose      70 - 99 mg/dL  81   Urea Nitrogen      7 - 30 mg/dL  28   Creatinine      0.66 - 1.25 mg/dL  1.26 (H)   GFR Estimate      >60 mL/min/1.7m2  57 (L)   GFR Estimate If Black      >60 mL/min/1.7m2  69   Calcium      8.5 - 10.1 mg/dL  9.2   Phosphorus      2.5 - 4.5 mg/dL  2.1 (L)   Albumin      3.4 - 5.0 g/dL  1.7 (L)   Iron      35 - 180 ug/dL  129   Iron Binding Cap      240 - 430 ug/dL  292   Iron Saturation Index      15 - 46 %  44   Protein Random Urine       0.11    Protein Total Urine g/gr Creatinine      0 - 0.2 g/g Cr 0.14    Hemoglobin      13.3 - 17.7 g/dL  13.0 (L)   Ferritin      26 - 388 ng/mL  193   Parathormone Intact      12 - 72 pg/mL  49   Vitamin D Deficiency screening      30 - 75 ug/L  55   Creatinine Urine       84    Copath Report           Proteinase 3 Antibody IgG      0.0 - 0.9 AI       Component      Latest Ref Rng 6/11/2015 6/11/2015          11:15 AM 12:18 PM   Copath Report       Patient Name: ADONAY FAITH . . .    Proteinase 3 Antibody IgG      0.0 - 0.9 AI  0.8     Copath Report     Patient Name: ADONAY FAITH  MR#: [id number]  Specimen #: [id number]  Collected: 6/11/2015  Received: 6/11/2015  Reported: 6/12/2015 17:37  Ordering Phy(s): DANIELLE PARIS    SPECIMEN(S):  Nasal septum    FINAL DIAGNOSIS:  Nasal septum, biopsy:  -Squamous mucosa with ulcer, marked acute inflammation and reactive  changes  -No granulomas identified  -A special stain for fungi (GMS) is negative    I have personally reviewed all specimens and or slides, including the  listed special stains, and used them with my medical judgement to  determine the final diagnosis.    Electronically signed out  "by:    Dorys Barton M.D., [facility]    CLINICAL HISTORY:  The patient is a 66-year-old man with a history of left upper back  melanoma, resected in 2011 (see consult report [id number]). 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 He now presents for followup visit with nasal  cavity scabs, increased arthralgia and fatigue, concerning for  vasculitis flare. Operative procedure: Nasal septum biopsy.    GROSS:  The specimen is received in formalin with proper patient identification,  labeled \"septal tissue\". The specimen consists of three tan-pink tissue  fragments, 0.2 cm in greatest dimension, entirely submitted in cassette  1. (Dictated by: Ana Mixon 6/11/2015 03:39 PM)    MICROSCOPIC:  Microscopic examination is performed. A GMS stain, performed with  appropriate positive control, is negative.    CPT Codes:  A: 52648-CG3, 58744-LFM    TESTING LAB LOCATION:  [facility], [address] [address]  [phone]    COLLECTION SITE:  Client: [facility]  Location: [facility] (B)     Component      Latest Ref Rng 12/10/2015   WBC      4.0 - 11.0 10e9/L 5.8   RBC Count      4.4 - 5.9 10e12/L 4.32 (L)   Hemoglobin      13.3 - 17.7 g/dL 13.9   Hematocrit      40.0 - 53.0 % 42.0   MCV      78 - 100 fl 97   MCH      26.5 - 33.0 pg 32.2   MCHC      31.5 - 36.5 g/dL 33.1   RDW      10.0 - 15.0 % 12.5   Platelet Count      150 - 450 10e9/L 199   Diff Method       Automated Method   % Neutrophils       70.2   % Lymphocytes       10.8   % Monocytes       9.6   % Eosinophils       7.7   % Basophils       1.7   Absolute Neutrophil      1.6 - 8.3 10e9/L 4.1   Absolute Lymphocytes      0.8 - 5.3 10e9/L 0.6 (L)   Absolute " Monoctyes      0.0 - 1.3 10e9/L 0.6   Absolute Eosinophils      0.0 - 0.7 10e9/L 0.5   Absolute Basophils      0.0 - 0.2 10e9/L 0.1   Sodium      133 - 144 mmol/L 140   Potassium      3.4 - 5.3 mmol/L 4.7   Chloride      94 - 109 mmol/L 107   Carbon Dioxide      20 - 32 mmol/L 25   Anion Gap      3 - 14 mmol/L 8   Glucose      70 - 99 mg/dL 91   Urea Nitrogen      7 - 30 mg/dL 28   Creatinine      0.66 - 1.25 mg/dL 1.40 (H)   GFR Estimate      >60 mL/min/1.7m2 50 (L)   GFR Estimate If Black      >60 mL/min/1.7m2 61   Calcium      8.5 - 10.1 mg/dL 9.1   Bilirubin Total      0.2 - 1.3 mg/dL 0.7   Albumin      3.4 - 5.0 g/dL 3.9   Protein Total      6.8 - 8.8 g/dL 6.9   Alkaline Phosphatase      40 - 150 U/L 103   ALT      0 - 70 U/L 28   AST      0 - 45 U/L 16   Color Urine       Yellow   Appearance Urine       Clear   Glucose Urine      NEG mg/dL Negative   Bilirubin Urine      NEG Negative   Ketones Urine      NEG mg/dL Negative   Specific Gravity Urine      1.003 - 1.035 >1.030   pH Urine      5.0 - 7.0 pH 5.5   Protein Albumin Urine      NEG mg/dL Negative   Urobilinogen Urine      0.2 - 1.0 EU/dL 0.2   Nitrite Urine      NEG Negative   Blood Urine      NEG Negative   Leukocyte Esterase Urine      NEG Negative   Source       Midstream Urine   WBC Urine      0 - 2 /HPF O - 2   RBC Urine      0 - 2 /HPF O - 2   Protein Random Urine       0.16   Protein Total Urine g/gr Creatinine      0 - 0.2 g/g Cr 0.11   Myeloperoxidase Antibody IgG      0.0 - 0.9 AI <0.2 . . .   Proteinase 3 Antibody IgG      0.0 - 0.9 AI 0.2   CRP Inflammation      0.0 - 8.0 mg/L <2.9   Sed Rate      0 - 20 mm/h 6   Creatinine Urine Random       142   Neutrophil Cytoplasmic IgG Antibody       <1:20 . . .   Creatinine Urine       142     Component      Latest Ref Rn 3/14/2016   WBC      4.0 - 11.0 10e9/L 7.2   RBC Count      4.4 - 5.9 10e12/L 4.07 (L)   Hemoglobin      13.3 - 17.7 g/dL 13.4   Hematocrit      40.0 - 53.0 % 38.8 (L)   MCV      78  - 100 fl 95   MCH      26.5 - 33.0 pg 32.9   MCHC      31.5 - 36.5 g/dL 34.5   RDW      10.0 - 15.0 % 12.5   Platelet Count      150 - 450 10e9/L 194   Diff Method       Automated Method   % Neutrophils       68.1   % Lymphocytes       14.0   % Monocytes       11.7   % Eosinophils       5.5   % Basophils       0.7   Absolute Neutrophil      1.6 - 8.3 10e9/L 4.9   Absolute Lymphocytes      0.8 - 5.3 10e9/L 1.0   Absolute Monocytes      0.0 - 1.3 10e9/L 0.8   Absolute Eosinophils      0.0 - 0.7 10e9/L 0.4   Absolute Basophils      0.0 - 0.2 10e9/L 0.1   Sodium      133 - 144 mmol/L 136   Potassium      3.4 - 5.3 mmol/L 4.7   Chloride      94 - 109 mmol/L 106   Carbon Dioxide      20 - 32 mmol/L 25   Anion Gap      3 - 14 mmol/L 5   Glucose      70 - 99 mg/dL 86   Urea Nitrogen      7 - 30 mg/dL 32 (H)   Creatinine      0.66 - 1.25 mg/dL 1.62 (H)   GFR Estimate      >60 mL/min/1.7m2 42 (L)   GFR Estimate If Black      >60 mL/min/1.7m2 51 (L)   Calcium      8.5 - 10.1 mg/dL 9.2   Bilirubin Total      0.2 - 1.3 mg/dL 0.7   Albumin      3.4 - 5.0 g/dL 4.0   Protein Total      6.8 - 8.8 g/dL 6.9   Alkaline Phosphatase      40 - 150 U/L 94   ALT      0 - 70 U/L 34   AST      0 - 45 U/L 24   Color Urine       Yellow   Appearance Urine       Clear   Glucose Urine      NEG mg/dL Negative   Bilirubin Urine      NEG Negative   Ketones Urine      NEG mg/dL Negative   Specific Gravity Urine      1.003 - 1.035 <=1.005   pH Urine      5.0 - 7.0 pH 5.5   Protein Albumin Urine      NEG mg/dL Negative   Urobilinogen Urine      0.2 - 1.0 EU/dL 0.2   Nitrite Urine      NEG Negative   Blood Urine      NEG Negative   Leukocyte Esterase Urine      NEG Negative   Source       Midstream Urine   WBC Urine      0 - 2 /HPF    RBC Urine      0 - 2 /HPF    Myeloperoxidase Antibody IgG      0.0 - 0.9 AI <0.2 . . .   Proteinase 3 Antibody IgG      0.0 - 0.9 AI 1.7 (H)   Protein Random Urine       <0.05   Protein Total Urine g/gr Creatinine      0 -  0.2 g/g Cr Unable to calculate due to low value   CRP Inflammation      0.0 - 8.0 mg/L <2.9   Sed Rate      0 - 20 mm/h 5   Neutrophil Cytoplasmic IgG Antibody       <1:20 . . .   Magnesium      1.6 - 2.3 mg/dL 2.1   Creatinine Urine Random       26   Copath Report          Creatinine Urine            Component      Latest Ref Rn 4/18/2016   WBC      4.0 - 11.0 10e9/L 6.7   RBC Count      4.4 - 5.9 10e12/L 4.14 (L)   Hemoglobin      13.3 - 17.7 g/dL 13.3   Hematocrit      40.0 - 53.0 % 39.5 (L)   MCV      78 - 100 fl 95   MCH      26.5 - 33.0 pg 32.1   MCHC      31.5 - 36.5 g/dL 33.7   RDW      10.0 - 15.0 % 12.5   Platelet Count      150 - 450 10e9/L 218   Diff Method       Automated Method   % Neutrophils       65.4   % Lymphocytes       16.0   % Monocytes       13.2   % Eosinophils       4.2   % Basophils       1.2   Absolute Neutrophil      1.6 - 8.3 10e9/L 4.4   Absolute Lymphocytes      0.8 - 5.3 10e9/L 1.1   Absolute Monocytes      0.0 - 1.3 10e9/L 0.9   Absolute Eosinophils      0.0 - 0.7 10e9/L 0.3   Absolute Basophils      0.0 - 0.2 10e9/L 0.1   Sodium      133 - 144 mmol/L    Potassium      3.4 - 5.3 mmol/L    Chloride      94 - 109 mmol/L    Carbon Dioxide      20 - 32 mmol/L    Anion Gap      3 - 14 mmol/L    Glucose      70 - 99 mg/dL    Urea Nitrogen      7 - 30 mg/dL    Creatinine      0.66 - 1.25 mg/dL 1.72 (H)   GFR Estimate      >60 mL/min/1.7m2 40 (L)   GFR Estimate If Black      >60 mL/min/1.7m2 48 (L)   Calcium      8.5 - 10.1 mg/dL    Bilirubin Total      0.2 - 1.3 mg/dL    Albumin      3.4 - 5.0 g/dL    Protein Total      6.8 - 8.8 g/dL    Alkaline Phosphatase      40 - 150 U/L    ALT      0 - 70 U/L 30   AST      0 - 45 U/L 22   Color Urine       Yellow   Appearance Urine       Clear   Glucose Urine      NEG mg/dL Negative   Bilirubin Urine      NEG Negative   Ketones Urine      NEG mg/dL Negative   Specific Gravity Urine      1.003 - 1.035 <=1.005   pH Urine      5.0 - 7.0 pH 5.0   Protein  Albumin Urine      NEG mg/dL Negative   Urobilinogen Urine      0.2 - 1.0 EU/dL 0.2   Nitrite Urine      NEG Negative   Blood Urine      NEG Negative   Leukocyte Esterase Urine      NEG Negative   Source       Midstream Urine   WBC Urine      0 - 2 /HPF O - 2   RBC Urine      0 - 2 /HPF O - 2   Myeloperoxidase Antibody IgG      0.0 - 0.9 AI    Proteinase 3 Antibody IgG      0.0 - 0.9 AI 2.0 (H)   Protein Random Urine       <0.05   Protein Total Urine g/gr Creatinine      0 - 0.2 g/g Cr Unable to calculate due to low value   CRP Inflammation      0.0 - 8.0 mg/L 8.4 (H)   Sed Rate      0 - 20 mm/h 9   Neutrophil Cytoplasmic IgG Antibody       <1:20 . . .   Magnesium      1.6 - 2.3 mg/dL    Creatinine Urine Random          Copath Report          Creatinine Urine       53           Component      Latest Ref Rng 7/6/2016   WBC      4.0 - 11.0 10e9/L 7.9   RBC Count      4.4 - 5.9 10e12/L 4.31 (L)   Hemoglobin      13.3 - 17.7 g/dL 13.9   Hematocrit      40.0 - 53.0 % 41.5   MCV      78 - 100 fl 96   MCH      26.5 - 33.0 pg 32.3   MCHC      31.5 - 36.5 g/dL 33.5   RDW      10.0 - 15.0 % 12.5   Platelet Count      150 - 450 10e9/L 197   Diff Method       Automated Method   % Neutrophils       68.1   % Lymphocytes       13.2   % Monocytes       11.8   % Eosinophils       5.6   % Basophils       1.3   Absolute Neutrophil      1.6 - 8.3 10e9/L 5.4   Absolute Lymphocytes      0.8 - 5.3 10e9/L 1.0   Absolute Monocytes      0.0 - 1.3 10e9/L 0.9   Absolute Eosinophils      0.0 - 0.7 10e9/L 0.4   Absolute Basophils      0.0 - 0.2 10e9/L 0.1   Color Urine       Yellow   Appearance Urine       Clear   Glucose Urine      NEG mg/dL Negative   Bilirubin Urine      NEG Negative   Ketones Urine      NEG mg/dL Negative   Specific Gravity Urine      1.003 - 1.035 1.015   Blood Urine      NEG Negative   pH Urine      5.0 - 7.0 pH 5.5   Protein Albumin Urine      NEG mg/dL Negative   Urobilinogen Urine      0.2 - 1.0 EU/dL 0.2   Nitrite  Urine      NEG Negative   Leukocyte Esterase Urine      NEG Negative   Source       Midstream Urine   Creatinine      0.66 - 1.25 mg/dL 1.62 (H)   GFR Estimate      >60 mL/min/1.7m2 42 (L)   GFR Estimate If Black      >60 mL/min/1.7m2 51 (L)   Protein Random Urine       0.10   Protein Total Urine g/gr Creatinine      0 - 0.2 g/g Cr 0.11   Myeloperoxidase Antibody IgG      0.0 - 0.9 AI <0.2 . . .   Proteinase 3 Antibody IgG      0.0 - 0.9 AI 1.3 (H)   CRP Inflammation      0.0 - 8.0 mg/L <2.9   Sed Rate      0 - 20 mm/h 5   Creatinine Urine Random       91   Neutrophil Cytoplasmic IgG Antibody       <1:20 . . .   ALT      0 - 70 U/L 28   AST      0 - 45 U/L 19   Albumin      3.4 - 5.0 g/dL 4.0   Creatinine Urine       91     Component      Latest Ref Rn 10/24/2016   WBC      4.0 - 11.0 10e9/L 6.3   RBC Count      4.4 - 5.9 10e12/L 4.18 (L)   Hemoglobin      13.3 - 17.7 g/dL 13.5   Hematocrit      40.0 - 53.0 % 40.1   MCV      78 - 100 fl 96   MCH      26.5 - 33.0 pg 32.3   MCHC      31.5 - 36.5 g/dL 33.7   RDW      10.0 - 15.0 % 12.7   Platelet Count      150 - 450 10e9/L 190   Diff Method       Automated Method   % Neutrophils       67.6   % Lymphocytes       14.9   % Monocytes       12.1   % Eosinophils       4.3   % Basophils       1.1   Absolute Neutrophil      1.6 - 8.3 10e9/L 4.3   Absolute Lymphocytes      0.8 - 5.3 10e9/L 0.9   Absolute Monocytes      0.0 - 1.3 10e9/L 0.8   Absolute Eosinophils      0.0 - 0.7 10e9/L 0.3   Absolute Basophils      0.0 - 0.2 10e9/L 0.1   Color Urine       Yellow   Appearance Urine       Clear   Glucose Urine      NEG mg/dL Negative   Bilirubin Urine      NEG Negative   Ketones Urine      NEG mg/dL Negative   Specific Gravity Urine      1.003 - 1.035 1.010   pH Urine      5.0 - 7.0 pH 5.0   Protein Albumin Urine      NEG mg/dL Negative   Urobilinogen Urine      0.2 - 1.0 EU/dL 0.2   Nitrite Urine      NEG Negative   Blood Urine      NEG Negative   Leukocyte Esterase Urine       NEG Negative   Source       Midstream Urine   WBC Urine      0 - 2 /HPF O - 2   RBC Urine      0 - 2 /HPF O - 2   Creatinine      0.66 - 1.25 mg/dL 1.38 (H)   GFR Estimate      >60 mL/min/1.7m2 51 (L)   GFR Estimate If Black      >60 mL/min/1.7m2 62   Protein Random Urine       <0.05   Protein Total Urine g/gr Creatinine      0 - 0.2 g/g Cr Unable to calculate due to low value   Albumin      3.4 - 5.0 g/dL 3.9   ALT      0 - 70 U/L 26   AST      0 - 45 U/L 25   CRP Inflammation      0.0 - 8.0 mg/L 6.1   Sed Rate      0 - 20 mm/h 6   Creatinine Urine Random       36   Proteinase 3 Antibody IgG      0.0 - 0.9 AI 1.2 (H)   Neutrophil Cytoplasmic IgG Antibody       <1:20 . . .   SAKINA  [employer]       Canceled, Test credited   DNA-ds      <10 IU/mL <1 . . .   Creatinine Urine       36   Direct Antiglobulin       Neg       Ede Sanchez MD

## 2018-03-22 NOTE — PROGRESS NOTES
"HPI     1 day PO 25-gauge ppv with peripheral   iridectomy, endolaser, air-fluid exchange, injection of SF6 gas 20% to the   right eye.    Last night and early this am pain more irritation than anything.   Slept good after taking pain pill      HPI     Concerns About Ocular Health    Additional comments: Poss RD OD           Comments   Patient states she started to have occasional flashes and floaters in OD along with a "pressure" feeling about 5 days now. Her IOP was 42 in OS when she saw an optometrist who started her on Timolol BID OD and referred her here. Currently, she still sees some flashes and floaters in OD with   some discomfort. Her va has always been poor in OD.    A/P    1. RRD OD  With HST at 10:00 and submacular fluid    s/p 25g PPV/EL/AFx/SF6 OD 3/21/18  Doing well, good IOP    Start gtts ointment/shield     Sleep on left, ok for face forward throughout day    1 week    2. Aphakia OD    3. 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 Amblyopia OD  Fairly dense given early onset of Va loss  But recent decline is noticeable in peripheral vision        "

## 2018-03-22 NOTE — TRANSFER OF CARE
"Anesthesia Transfer of Care Note    Patient: Shital Duong    Procedure(s) Performed: Procedure(s) (LRB):  REPAIR-RETINA (VITRECTOMY) (Right)    Anesthesia Type: MAC    Transport from OR: Transported from OR on room air with adequate spontaneous ventilation    Post pain: adequate analgesia    Post assessment: no apparent anesthetic complications    Post vital signs: stable    Level of consciousness: awake    Nausea/Vomiting: no nausea/vomiting    Complications: none    Transfer of care protocol was followed      Last vitals:   Visit Vitals  BP (!) 156/86   Pulse 69   Temp 36.9 °C (98.4 °F) (Oral)   Resp 16   Ht 5' 1" (1.549 m)   Wt 67.6 kg (149 lb)   SpO2 100%   Breastfeeding? No   BMI 28.15 kg/m²     "

## 2018-03-22 NOTE — PLAN OF CARE
Patient and son state they are ready to be discharged. Instructions and eye bag given to patient and family. Both verbalize understanding. Patient tolerating po liquids with no difficulty. Patient states pain is at a tolerable level for them. Anesthesia consent and surgical consent in chart upon patient's discharge from [facility].

## 2018-03-29 ENCOUNTER — OFFICE VISIT (OUTPATIENT)
Dept: OPHTHALMOLOGY | Facility: CLINIC | Age: 48
End: 2018-03-29
Payer: COMMERCIAL

## 2018-03-29 DIAGNOSIS — H33.011 RETINAL DETACHMENT OF RIGHT EYE WITH SINGLE BREAK: Primary | ICD-10-CM

## 2018-03-29 DIAGNOSIS — H40.051 OCULAR HYPERTENSION, RIGHT: ICD-10-CM

## 2018-03-29 PROCEDURE — 99024 POSTOP FOLLOW-UP VISIT: CPT | Mod: S$GLB,,, | Performed by: OPHTHALMOLOGY

## 2018-03-29 PROCEDURE — 99999 PR PBB SHADOW E&M-EST. PATIENT-LVL III: CPT | Mod: PBBFAC,,, | Performed by: OPHTHALMOLOGY

## 2018-03-29 RX ORDER — BRIMONIDINE TARTRATE AND TIMOLOL MALEATE 2; 5 MG/ML; MG/ML
1 SOLUTION OPHTHALMIC 2 TIMES DAILY
Qty: 5 ML | Refills: 3 | Status: SHIPPED | OUTPATIENT
Start: 2018-03-29 | End: 2018-06-28 | Stop reason: SDUPTHER

## 2018-03-29 NOTE — PROGRESS NOTES
"HPI     1 week PO 25-gauge ppv with peripheral   iridectomy, endolaser, air-fluid exchange, injection of SF6 gas 20% to the   right   eye.    OD very irritating all day gets a little better when using the gtts but   only temporary relief   Still see's bubble has gone down slightly but can not see any better due   to bubble   Headache over OD every day     Vig , PF , WELLS QID     Oint QHS        HPI     1 day PO 25-gauge ppv with peripheral   iridectomy, endolaser, air-fluid exchange, injection of SF6 gas 20% to the   right eye.    Last night and early this am pain more irritation than anything.   Slept good after taking pain pill      HPI     Concerns About Ocular Health    Additional comments: Poss RD OD           Comments   Patient states she started to have occasional flashes and floaters in OD along with a "pressure" feeling about 5 days now. Her IOP was 42 in OS when she saw an optometrist who started her on Timolol BID OD and referred her here. Currently, she still sees some flashes and floaters in OD with   some discomfort. Her va has always been poor in OD.    A/P    1. RRD OD  With HST at 10:00 and submacular fluid    s/p 25g PPV/EL/AFx/SF6 OD 3/21/18  Doing well,   IOP elevated again    D/C Vig/HA/ointment/shield  PF 2/1/0  Add combigan BID OD     5 days IOP check    2. Aphakia OD    3. 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 Amblyopia OD  Fairly dense given early onset of Va loss  But recent decline is noticeable in peripheral vision        "

## 2018-04-03 ENCOUNTER — OFFICE VISIT (OUTPATIENT)
Dept: OPHTHALMOLOGY | Facility: CLINIC | Age: 48
End: 2018-04-03
Payer: COMMERCIAL

## 2018-04-03 DIAGNOSIS — H53.001 AMBLYOPIA, RIGHT: ICD-10-CM

## 2018-04-03 DIAGNOSIS — H33.011 RETINAL DETACHMENT OF RIGHT EYE WITH SINGLE BREAK: Primary | ICD-10-CM

## 2018-04-03 DIAGNOSIS — H27.01 APHAKIA, RIGHT: ICD-10-CM

## 2018-04-03 DIAGNOSIS — H40.051 OCULAR HYPERTENSION, RIGHT: ICD-10-CM

## 2018-04-03 PROCEDURE — 99999 PR PBB SHADOW E&M-EST. PATIENT-LVL III: CPT | Mod: PBBFAC,,, | Performed by: OPHTHALMOLOGY

## 2018-04-03 PROCEDURE — 99024 POSTOP FOLLOW-UP VISIT: CPT | Mod: S$GLB,,, | Performed by: OPHTHALMOLOGY

## 2018-04-03 RX ORDER — ACETAZOLAMIDE 500 MG/1
500 CAPSULE, EXTENDED RELEASE ORAL 2 TIMES DAILY
Qty: 60 CAPSULE | Refills: 11 | Status: SHIPPED | OUTPATIENT
Start: 2018-04-03 | End: 2021-12-05 | Stop reason: SDUPTHER

## 2018-04-03 RX ORDER — BRINZOLAMIDE 10 MG/ML
1 SUSPENSION/ DROPS OPHTHALMIC 3 TIMES DAILY
Qty: 10 ML | Refills: 6 | Status: SHIPPED | OUTPATIENT
Start: 2018-04-03 | End: 2018-11-21 | Stop reason: SDUPTHER

## 2018-04-03 NOTE — PROGRESS NOTES
"HPI     DLS 3/29/18     Pt states has been in a lot of pain. Saturday -Sunday it was really bad.  Feels the IOP has been really high. Va has not changed.  Yesterday right side of face was red all around the eye and had 99.9 fever.  Took couple   of doses of Ibuprofen for relief.         Eye meds:  PF finished                     Combigan BID OD                     AT's     HPI     1 week PO 25-gauge ppv with peripheral   iridectomy, endolaser, air-fluid exchange, injection of SF6 gas 20% to the   right   eye.    OD very irritating all day gets a little better when using the gtts but   only temporary relief   Still see's bubble has gone down slightly but can not see any better due   to bubble   Headache over OD every day     Vig , PF , WELLS QID     Oint QHS        HPI     1 day PO 25-gauge ppv with peripheral   iridectomy, endolaser, air-fluid exchange, injection of SF6 gas 20% to the   right eye.    Last night and early this am pain more irritation than anything.   Slept good after taking pain pill      HPI     Concerns About Ocular Health    Additional comments: Poss RD OD           Comments   Patient states she started to have occasional flashes and floaters in OD along with a "pressure" feeling about 5 days now. Her IOP was 42 in OS when she saw an optometrist who started her on Timolol BID OD and referred her here. Currently, she still sees some flashes and floaters in OD with   some discomfort. Her va has always been poor in OD.    A/P    1. RRD OD  With HST at 10:00 and submacular fluid    s/p 25g PPV/EL/AFx/SF6 OD 3/21/18  Doing well,   IOP elevated again with K edema    DC PF  continue combigan BID OD   Azopt TID  Oral Diamox 500 BID    2 days IOP check with JDN - may need GDD    2. Aphakia OD    3. 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 Amblyopia OD  Fairly dense given " early onset of Va loss  But recent decline is noticeable in peripheral vision      3 weeks REtina check

## 2018-04-05 ENCOUNTER — OFFICE VISIT (OUTPATIENT)
Dept: OPHTHALMOLOGY | Facility: CLINIC | Age: 48
End: 2018-04-05
Payer: COMMERCIAL

## 2018-04-05 ENCOUNTER — TELEPHONE (OUTPATIENT)
Dept: OPHTHALMOLOGY | Facility: CLINIC | Age: 48
End: 2018-04-05

## 2018-04-05 DIAGNOSIS — H40.051 OCULAR HYPERTENSION, RIGHT: ICD-10-CM

## 2018-04-05 DIAGNOSIS — H40.31X2 GLAUCOMA OF RIGHT EYE ASSOCIATED WITH OCULAR TRAUMA, MODERATE STAGE: ICD-10-CM

## 2018-04-05 DIAGNOSIS — H27.01 APHAKIA, RIGHT: ICD-10-CM

## 2018-04-05 DIAGNOSIS — H33.011 RETINAL DETACHMENT OF RIGHT EYE WITH SINGLE BREAK: Primary | ICD-10-CM

## 2018-04-05 PROBLEM — H40.31X0: Status: ACTIVE | Noted: 2018-04-05

## 2018-04-05 PROCEDURE — 99024 POSTOP FOLLOW-UP VISIT: CPT | Mod: S$GLB,,, | Performed by: OPHTHALMOLOGY

## 2018-04-05 PROCEDURE — 99999 PR PBB SHADOW E&M-EST. PATIENT-LVL III: CPT | Mod: PBBFAC,,, | Performed by: OPHTHALMOLOGY

## 2018-04-05 NOTE — TELEPHONE ENCOUNTER
----- Message from Faustino Bowie sent at 4/5/2018 10:02 AM CDT -----  Contact: Shital  Ms. Duong went to  her prescription and pharmacy said it wasn't there. She can be reached at [phone]      [facility]/pharmacy #[id number] - [place] - [id number]  [address] [place] [address]  Phone: [phone] Fax: [phone]  Not a 24 hour pharmacy; exact hours not known

## 2018-04-05 NOTE — PROGRESS NOTES
HPI     IOP ck   DLS- 04/03/2018      Pt sts does not feel pressure sensation but feels scratchy itchy   irritating feeling in OD       Eye meds:                      Combigan BID OD                     Azopt TID OD                      AT's & oint   Diamox BID      A/P    1. RRD OD  With HST at 10:00 and submacular fluid    s/p 25g PPV/EL/AFx/SF6 OD 3/21/18  Doing well,   IOP elevated again with K edema - now central epi defect - use ointment after gtts - erythro ok - avoid steroids    continue combigan BID OD   Azopt TID  Oral Diamox 500 BID    Has an appt with [facility] 4/10 - will send records  May need GDD       2. Aphakia OD    3. 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 Amblyopia OD  Fairly dense given early onset of Va loss  But recent decline is noticeable in peripheral vision      3 weeks Retina check

## 2018-04-23 ENCOUNTER — TELEPHONE (OUTPATIENT)
Dept: OPHTHALMOLOGY | Facility: CLINIC | Age: 48
End: 2018-04-23

## 2018-04-23 NOTE — TELEPHONE ENCOUNTER
----- Message from Faustino Bowie sent at 4/23/2018  1:50 PM CDT -----  Contact: Shital  Ms. Duong need to reschedule her post op appointment. She can be reached at [phone]. She would like the last available appointment.

## 2018-05-04 ENCOUNTER — OFFICE VISIT (OUTPATIENT)
Dept: OPHTHALMOLOGY | Facility: CLINIC | Age: 48
End: 2018-05-04
Payer: COMMERCIAL

## 2018-05-04 DIAGNOSIS — H40.31X2 GLAUCOMA OF RIGHT EYE ASSOCIATED WITH OCULAR TRAUMA, MODERATE STAGE: ICD-10-CM

## 2018-05-04 DIAGNOSIS — H33.011 RETINAL DETACHMENT OF RIGHT EYE WITH SINGLE BREAK: Primary | ICD-10-CM

## 2018-05-04 PROCEDURE — 99024 POSTOP FOLLOW-UP VISIT: CPT | Mod: S$GLB,,, | Performed by: OPHTHALMOLOGY

## 2018-05-04 PROCEDURE — 99999 PR PBB SHADOW E&M-EST. PATIENT-LVL II: CPT | Mod: PBBFAC,,, | Performed by: OPHTHALMOLOGY

## 2018-05-04 RX ORDER — OXYCODONE AND ACETAMINOPHEN 10; 325 MG/1; MG/1
TABLET ORAL
COMMUNITY
End: 2022-03-30 | Stop reason: CLARIF

## 2018-05-04 RX ORDER — CIPROFLOXACIN 500 MG/1
TABLET ORAL
COMMUNITY
End: 2022-03-30 | Stop reason: CLARIF

## 2018-05-04 RX ORDER — MELATONIN 10 MG
CAPSULE ORAL
COMMUNITY

## 2018-05-04 RX ORDER — PROMETHAZINE HYDROCHLORIDE 25 MG/1
TABLET ORAL
COMMUNITY
End: 2022-03-30 | Stop reason: CLARIF

## 2018-05-04 RX ORDER — TRAMADOL HYDROCHLORIDE 50 MG/1
TABLET ORAL
COMMUNITY
End: 2022-03-30 | Stop reason: CLARIF

## 2018-05-04 RX ORDER — CYCLOBENZAPRINE HCL 5 MG
TABLET ORAL
COMMUNITY
End: 2022-03-30 | Stop reason: CLARIF

## 2018-05-04 RX ORDER — METRONIDAZOLE 500 MG/1
TABLET ORAL
COMMUNITY
End: 2022-03-30 | Stop reason: CLARIF

## 2018-05-04 RX ORDER — ERYTHROMYCIN 5 MG/G
OINTMENT OPHTHALMIC
Refills: 1 | COMMUNITY
Start: 2018-04-05 | End: 2022-03-30 | Stop reason: CLARIF

## 2018-05-04 RX ORDER — TAMSULOSIN HYDROCHLORIDE 0.4 MG/1
CAPSULE ORAL
COMMUNITY
End: 2022-03-30 | Stop reason: CLARIF

## 2018-05-04 NOTE — PROGRESS NOTES
HPI     Post-op Evaluation    Additional comments: 1 month check           Comments   DLS 4/5/18- OD improved some since last visit. No eye pain    Combigan x 2 OD  azopt x 3 OD  HA 1-2 x day OD  vyzulta daily OD  ilevro daily OD  Annette 128 daily OD      HPI     IOP ck   DLS- 04/03/2018      Pt sts does not feel pressure sensation but feels scratchy itchy   irritating feeling in OD       Eye meds:                      Combigan BID OD                     Azopt TID OD                      AT's & oint   Diamox BID      A/P    1. RRD OD  With HST at 10:00 and submacular fluid    s/p 25g PPV/EL/AFx/SF6 OD 3/21/18  Doing well, IOP improved    continue combigan BID OD   Azopt TID      2. Aphakia OD    3. 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 Traumatic Glc  Dr. Turner managing  IOP much improved on current regimen    Combigan BID  Azopt TID      5. Amblyopia OD  Fairly dense given early onset of Va loss  But recent decline is noticeable in peripheral vision      3 months

## 2018-06-28 RX ORDER — BRIMONIDINE TARTRATE, TIMOLOL MALEATE 2; 5 MG/ML; MG/ML
1 SOLUTION/ DROPS OPHTHALMIC 2 TIMES DAILY
Qty: 5 ML | Refills: 3 | Status: SHIPPED | OUTPATIENT
Start: 2018-06-28 | End: 2022-03-30 | Stop reason: CLARIF

## 2018-11-21 RX ORDER — BRINZOLAMIDE 10 MG/ML
SUSPENSION/ DROPS OPHTHALMIC
Qty: 10 ML | Refills: 6 | Status: SHIPPED | OUTPATIENT
Start: 2018-11-21 | End: 2022-03-31

## 2021-06-24 ENCOUNTER — PATIENT MESSAGE (OUTPATIENT)
Dept: OBSTETRICS AND GYNECOLOGY | Facility: CLINIC | Age: 51
End: 2021-06-24

## 2021-09-15 DIAGNOSIS — U07.1 COVID-19: Primary | ICD-10-CM

## 2021-12-05 ENCOUNTER — HOSPITAL ENCOUNTER (EMERGENCY)
Facility: HOSPITAL | Age: 51
Discharge: HOME | End: 2021-12-05
Attending: EMERGENCY MEDICINE
Payer: MEDICAID

## 2021-12-05 VITALS
TEMPERATURE: 97 F | HEIGHT: 61 IN | HEART RATE: 80 BPM | WEIGHT: 148 LBS | BODY MASS INDEX: 27.94 KG/M2 | SYSTOLIC BLOOD PRESSURE: 139 MMHG | OXYGEN SATURATION: 98 % | DIASTOLIC BLOOD PRESSURE: 79 MMHG | RESPIRATION RATE: 18 BRPM

## 2021-12-05 DIAGNOSIS — H40.211: ICD-10-CM

## 2021-12-05 DIAGNOSIS — H57.11 ACUTE RIGHT EYE PAIN: Primary | ICD-10-CM

## 2021-12-05 PROCEDURE — 25000003 PHARM REV CODE 250: Performed by: EMERGENCY MEDICINE

## 2021-12-05 PROCEDURE — 99291 CRITICAL CARE FIRST HOUR: CPT

## 2021-12-05 RX ORDER — OXYCODONE AND ACETAMINOPHEN 5; 325 MG/1; MG/1
1 TABLET ORAL
Status: COMPLETED | OUTPATIENT
Start: 2021-12-05 | End: 2021-12-05

## 2021-12-05 RX ORDER — BRIMONIDINE TARTRATE 2 MG/ML
1 SOLUTION/ DROPS OPHTHALMIC ONCE
Status: COMPLETED | OUTPATIENT
Start: 2021-12-05 | End: 2021-12-05

## 2021-12-05 RX ORDER — PREDNISOLONE ACETATE 10 MG/ML
1 SUSPENSION/ DROPS OPHTHALMIC ONCE
Status: COMPLETED | OUTPATIENT
Start: 2021-12-05 | End: 2021-12-05

## 2021-12-05 RX ORDER — TIMOLOL MALEATE 5 MG/ML
1 SOLUTION/ DROPS OPHTHALMIC ONCE
Status: COMPLETED | OUTPATIENT
Start: 2021-12-05 | End: 2021-12-05

## 2021-12-05 RX ORDER — ONDANSETRON 4 MG/1
4 TABLET, ORALLY DISINTEGRATING ORAL
Status: COMPLETED | OUTPATIENT
Start: 2021-12-05 | End: 2021-12-05

## 2021-12-05 RX ORDER — PILOCARPINE HYDROCHLORIDE 20 MG/ML
2 SOLUTION/ DROPS OPHTHALMIC ONCE
Status: COMPLETED | OUTPATIENT
Start: 2021-12-05 | End: 2021-12-05

## 2021-12-05 RX ORDER — ACETAZOLAMIDE 250 MG/1
500 TABLET ORAL ONCE
Status: COMPLETED | OUTPATIENT
Start: 2021-12-05 | End: 2021-12-05

## 2021-12-05 RX ORDER — LATANOPROST 50 UG/ML
1 SOLUTION/ DROPS OPHTHALMIC ONCE
Status: COMPLETED | OUTPATIENT
Start: 2021-12-05 | End: 2021-12-05

## 2021-12-05 RX ORDER — ACETAZOLAMIDE 500 MG/1
500 CAPSULE, EXTENDED RELEASE ORAL 2 TIMES DAILY
Qty: 60 CAPSULE | Refills: 11 | Status: SHIPPED | OUTPATIENT
Start: 2021-12-05 | End: 2022-03-31

## 2021-12-05 RX ORDER — ACETAZOLAMIDE 250 MG/1
250 TABLET ORAL ONCE
Status: COMPLETED | OUTPATIENT
Start: 2021-12-05 | End: 2021-12-05

## 2021-12-05 RX ADMIN — ONDANSETRON 4 MG: 4 TABLET, ORALLY DISINTEGRATING ORAL at 07:12

## 2021-12-05 RX ADMIN — ACETAZOLAMIDE 500 MG: 250 TABLET ORAL at 08:12

## 2021-12-05 RX ADMIN — ACETAZOLAMIDE 250 MG: 250 TABLET ORAL at 09:12

## 2021-12-05 RX ADMIN — PILOCARPINE HYDROCHLORIDE 2 DROP: 20 SOLUTION/ DROPS OPHTHALMIC at 08:12

## 2021-12-05 RX ADMIN — LATANOPROST 1 DROP: 50 SOLUTION OPHTHALMIC at 08:12

## 2021-12-05 RX ADMIN — TIMOLOL MALEATE 1 DROP: 5 SOLUTION/ DROPS OPHTHALMIC at 08:12

## 2021-12-05 RX ADMIN — OXYCODONE HYDROCHLORIDE AND ACETAMINOPHEN 1 TABLET: 5; 325 TABLET ORAL at 07:12

## 2021-12-05 RX ADMIN — PREDNISOLONE ACETATE 1 DROP: 10 SUSPENSION/ DROPS OPHTHALMIC at 08:12

## 2021-12-05 RX ADMIN — BRIMONIDINE TARTRATE 1 DROP: 2 SOLUTION OPHTHALMIC at 08:12

## 2021-12-06 ENCOUNTER — TELEPHONE (OUTPATIENT)
Dept: OPHTHALMOLOGY | Facility: CLINIC | Age: 51
End: 2021-12-06
Payer: COMMERCIAL

## 2021-12-06 ENCOUNTER — OFFICE VISIT (OUTPATIENT)
Dept: OPHTHALMOLOGY | Facility: CLINIC | Age: 51
End: 2021-12-06
Payer: MEDICAID

## 2021-12-06 ENCOUNTER — TELEPHONE (OUTPATIENT)
Dept: OPHTHALMOLOGY | Facility: CLINIC | Age: 51
End: 2021-12-06

## 2021-12-06 DIAGNOSIS — H40.31X3 GLAUCOMA OF RIGHT EYE ASSOCIATED WITH OCULAR TRAUMA, SEVERE STAGE: Primary | ICD-10-CM

## 2021-12-06 DIAGNOSIS — H27.01 APHAKIA, RIGHT: ICD-10-CM

## 2021-12-06 PROCEDURE — 99999 PR PBB SHADOW E&M-EST. PATIENT-LVL II: CPT | Mod: PBBFAC,,, | Performed by: OPHTHALMOLOGY

## 2021-12-06 PROCEDURE — 99212 OFFICE O/P EST SF 10 MIN: CPT | Mod: PBBFAC | Performed by: OPHTHALMOLOGY

## 2021-12-06 PROCEDURE — 99203 OFFICE O/P NEW LOW 30 MIN: CPT | Mod: S$PBB,,, | Performed by: OPHTHALMOLOGY

## 2021-12-06 PROCEDURE — 99203 PR OFFICE/OUTPT VISIT, NEW, LEVL III, 30-44 MIN: ICD-10-PCS | Mod: S$PBB,,, | Performed by: OPHTHALMOLOGY

## 2021-12-06 PROCEDURE — 99999 PR PBB SHADOW E&M-EST. PATIENT-LVL II: ICD-10-PCS | Mod: PBBFAC,,, | Performed by: OPHTHALMOLOGY

## 2022-02-16 ENCOUNTER — OFFICE VISIT (OUTPATIENT)
Dept: OPHTHALMOLOGY | Facility: CLINIC | Age: 52
End: 2022-02-16
Payer: MEDICAID

## 2022-02-16 DIAGNOSIS — H40.31X3 GLAUCOMA OF RIGHT EYE ASSOCIATED WITH OCULAR TRAUMA, SEVERE STAGE: Primary | ICD-10-CM

## 2022-02-16 DIAGNOSIS — H27.01 APHAKIA, RIGHT: ICD-10-CM

## 2022-02-16 PROCEDURE — 1160F PR REVIEW ALL MEDS BY PRESCRIBER/CLIN PHARMACIST DOCUMENTED: ICD-10-PCS | Mod: CPTII,,, | Performed by: OPHTHALMOLOGY

## 2022-02-16 PROCEDURE — 99213 OFFICE O/P EST LOW 20 MIN: CPT | Mod: S$PBB,,, | Performed by: OPHTHALMOLOGY

## 2022-02-16 PROCEDURE — 1159F MED LIST DOCD IN RCRD: CPT | Mod: CPTII,,, | Performed by: OPHTHALMOLOGY

## 2022-02-16 PROCEDURE — 99999 PR PBB SHADOW E&M-EST. PATIENT-LVL III: CPT | Mod: PBBFAC,,, | Performed by: OPHTHALMOLOGY

## 2022-02-16 PROCEDURE — 99213 PR OFFICE/OUTPT VISIT, EST, LEVL III, 20-29 MIN: ICD-10-PCS | Mod: S$PBB,,, | Performed by: OPHTHALMOLOGY

## 2022-02-16 PROCEDURE — 1160F RVW MEDS BY RX/DR IN RCRD: CPT | Mod: CPTII,,, | Performed by: OPHTHALMOLOGY

## 2022-02-16 PROCEDURE — 99213 OFFICE O/P EST LOW 20 MIN: CPT | Mod: PBBFAC | Performed by: OPHTHALMOLOGY

## 2022-02-16 PROCEDURE — 1159F PR MEDICATION LIST DOCUMENTED IN MEDICAL RECORD: ICD-10-PCS | Mod: CPTII,,, | Performed by: OPHTHALMOLOGY

## 2022-02-16 PROCEDURE — 99999 PR PBB SHADOW E&M-EST. PATIENT-LVL III: ICD-10-PCS | Mod: PBBFAC,,, | Performed by: OPHTHALMOLOGY

## 2022-02-16 RX ORDER — PANTOPRAZOLE SODIUM 20 MG/1
20 TABLET, DELAYED RELEASE ORAL
COMMUNITY
Start: 2022-02-14 | End: 2022-03-30 | Stop reason: CLARIF

## 2022-02-16 NOTE — PROGRESS NOTES
HPI     Triage pt  Patient states OD eye pain and pressure x this morning.  8 on pain scale.  Taking Diamox 500mg 2x daily  LP-OD  OD dilated--ocular trauma  Eye drops:Annette 2 8 dailly OU(haven't used in a while)    I have personally interviewed the patient, reviewed the history and   examined the patient and agree with the technician's exam.     Last edited by Juan Sellers MD on 2/16/2022  2:52 PM. (History)            Assessment /Plan     For exam results, see Encounter Report.    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 Pain improved. Dr. Chaves 2 days.

## 2022-02-18 ENCOUNTER — OFFICE VISIT (OUTPATIENT)
Dept: OPHTHALMOLOGY | Facility: CLINIC | Age: 52
End: 2022-02-18
Payer: MEDICAID

## 2022-02-18 DIAGNOSIS — H40.051 OCULAR HYPERTENSION, RIGHT: ICD-10-CM

## 2022-02-18 DIAGNOSIS — H53.001 AMBLYOPIA, RIGHT: ICD-10-CM

## 2022-02-18 DIAGNOSIS — H27.01 APHAKIA, RIGHT: ICD-10-CM

## 2022-02-18 DIAGNOSIS — H33.011 RETINAL DETACHMENT OF RIGHT EYE WITH SINGLE BREAK: ICD-10-CM

## 2022-02-18 DIAGNOSIS — H40.31X3 GLAUCOMA OF RIGHT EYE ASSOCIATED WITH OCULAR TRAUMA, SEVERE STAGE: Primary | ICD-10-CM

## 2022-02-18 PROCEDURE — 99213 OFFICE O/P EST LOW 20 MIN: CPT | Mod: PBBFAC | Performed by: STUDENT IN AN ORGANIZED HEALTH CARE EDUCATION/TRAINING PROGRAM

## 2022-02-18 PROCEDURE — 1160F RVW MEDS BY RX/DR IN RCRD: CPT | Mod: CPTII,,, | Performed by: STUDENT IN AN ORGANIZED HEALTH CARE EDUCATION/TRAINING PROGRAM

## 2022-02-18 PROCEDURE — 1160F PR REVIEW ALL MEDS BY PRESCRIBER/CLIN PHARMACIST DOCUMENTED: ICD-10-PCS | Mod: CPTII,,, | Performed by: STUDENT IN AN ORGANIZED HEALTH CARE EDUCATION/TRAINING PROGRAM

## 2022-02-18 PROCEDURE — 1159F PR MEDICATION LIST DOCUMENTED IN MEDICAL RECORD: ICD-10-PCS | Mod: CPTII,,, | Performed by: STUDENT IN AN ORGANIZED HEALTH CARE EDUCATION/TRAINING PROGRAM

## 2022-02-18 PROCEDURE — 1159F MED LIST DOCD IN RCRD: CPT | Mod: CPTII,,, | Performed by: STUDENT IN AN ORGANIZED HEALTH CARE EDUCATION/TRAINING PROGRAM

## 2022-02-18 PROCEDURE — 99999 PR PBB SHADOW E&M-EST. PATIENT-LVL III: ICD-10-PCS | Mod: PBBFAC,,, | Performed by: STUDENT IN AN ORGANIZED HEALTH CARE EDUCATION/TRAINING PROGRAM

## 2022-02-18 PROCEDURE — 99214 PR OFFICE/OUTPT VISIT, EST, LEVL IV, 30-39 MIN: ICD-10-PCS | Mod: S$PBB,,, | Performed by: STUDENT IN AN ORGANIZED HEALTH CARE EDUCATION/TRAINING PROGRAM

## 2022-02-18 PROCEDURE — 99999 PR PBB SHADOW E&M-EST. PATIENT-LVL III: CPT | Mod: PBBFAC,,, | Performed by: STUDENT IN AN ORGANIZED HEALTH CARE EDUCATION/TRAINING PROGRAM

## 2022-02-18 PROCEDURE — 99214 OFFICE O/P EST MOD 30 MIN: CPT | Mod: S$PBB,,, | Performed by: STUDENT IN AN ORGANIZED HEALTH CARE EDUCATION/TRAINING PROGRAM

## 2022-02-18 NOTE — PROGRESS NOTES
Subjective:       Patient ID: Shital Duong is a 52 y.o. female.    Chief Complaint: Glaucoma (High IOP Eval)     HPI     Glaucoma     Comments: High IOP Eval              Comments     Patient states her right eye is feeling well today. No pain(Saw Dr. Sellers 2 days ago).    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, severe stage    Aphakia, right    Retinal detachment of right eye with single break    Ocular hypertension, right    Amblyopia, right    Glaucoma associated with ocular trauma OD - severe state  Aphakic OD  APD OD  Aphakic after trauma at 1 y/o  Presented to [facility] 2/16/22 with IOP to 70s  Had been out of drops  -Fhx (), Steroids (),Trauma(+)  -Drops: timolol BID OD // Azopt BID OD // Diamox 500 PO BID  -Drop intolerance/contraindication:  -Laser:  -Surgeries: RD repair, cataract extraction  -CCT: 653 // 603  -Gonio:    Pt c/o ear ringing and parasthesias with diamox  Trouble with taking drops  Many trips to ED for pain    Has followed for many years with     Aphakic but post vitrectomy     IOP not within acceptable range relative to target IOP with risk of irreversible glaucomatous visual loss. Additional treatment required.  Discussed options, risks, and benefits of additional medications, laser treatment including SLT laser or G6 laser, or incisional glaucoma surgery.      Recommend GDI OD - BV or Clearpath     Patient chooses GDI OD - BV or Clearpath     Reviewed importance of continued compliance with treatment and follow up.      D/c asa 2 weeks before  sx     Uncontrolled glaucoma on maximum tolerated medical therapy: Significant risk of continued irreversible glaucomatous vision loss with current level of treatment. Therefore surgical options were reviewed at great length with the patient and GDI OD recommended in hopes of reducing the IOP to a more tolerable level so as to decrease risk of continued glaucomatous vision loss. A lengthy discussion of the risks, benefits and alternatives was presented to the patient including balancing the immediate risks of vision loss from surgery versus the likelihood of continued vision loss from the inadequately controlled glaucoma. Also discussed the need for frequent, careful follow-up exams for monitoring and other possible post-operative adjustments.  Informed consent signed.       Aphakia OD  Injury to right eye as a child with subsequent cataract formation.   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      Hx RRD OD  s/p 25g PPV/EL/AFx/SF6 OD 3/21/18  Dr. Wilson     Functionally Monocular  Discussed monocular status with patient and increased risk of damage to well-seeing eye  Recommend protective eyewear at all times  Any MRx should be written with polycarbonate lenses    Amblyopia OD  Fairly dense given early onset of Va loss        PLAN  Schedule GDI OD ( versus clearpath)  Change Diamox to 1 time per day only    RTC POD#1    Delma Chaves M.D., M.S.  Department of Ophthalmology   Division of Glaucoma Surgery  [facility]

## 2022-02-21 ENCOUNTER — TELEPHONE (OUTPATIENT)
Dept: OPHTHALMOLOGY | Facility: CLINIC | Age: 52
End: 2022-02-21
Payer: MEDICAID

## 2022-02-21 NOTE — TELEPHONE ENCOUNTER
----- Message from Nanci Prabhakar sent at 2/21/2022 12:34 PM CST -----  Contact: Pt @ [phone]  Pt is stating she is having high pressure in OD and it's really bothering her. Pls call back to assist further.

## 2022-02-21 NOTE — TELEPHONE ENCOUNTER
Pt states she woke up this morning with a pressure headache and thinks maybe her eye pressure is up. I told the patient that Dr. Chaves isn't in office today but she can come to main campus to see our triage doctor which is Dr. Lundberg but pt declined and stated that Dr. Chaves wanted her to call if anything changes and it did because now she has a pressure headache. Pt states she will call tomorrow if she needs to be seen.

## 2022-03-04 ENCOUNTER — TELEPHONE (OUTPATIENT)
Dept: OPHTHALMOLOGY | Facility: CLINIC | Age: 52
End: 2022-03-04
Payer: MEDICAID

## 2022-03-04 DIAGNOSIS — H40.31X3 GLAUCOMA OF RIGHT EYE ASSOCIATED WITH OCULAR TRAUMA, SEVERE STAGE: Primary | ICD-10-CM

## 2022-03-16 ENCOUNTER — TELEPHONE (OUTPATIENT)
Dept: OPHTHALMOLOGY | Facility: CLINIC | Age: 52
End: 2022-03-16
Payer: MEDICAID

## 2022-03-16 NOTE — TELEPHONE ENCOUNTER
----- Message from Carol Coles sent at 3/16/2022  3:09 PM CDT -----  Regarding: COVID TEST  Contact: Self  Pt stated she need to reschedule her COVID TEST to 03/29/2022 at anytime Pt ask for a call      Contact info   [phone] (home)

## 2022-03-29 NOTE — H&P
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 Pt reports consistency with maximally tolerated medical treatment. Pt understands the need for further IOP lowering and presents for planned insertion of glaucoma drainage device.     Past Medical History:  No past medical history on file.     Past Surgical History:  Past Surgical History:   Procedure Laterality Date    BREAST CYST EXCISION      BREAST SURGERY      Gastric sleeve      INSERTION OF BREAST IMPLANT      REDUCTION OF BOTH BREASTS      RETINAL DETACHMENT SURGERY          Past Ocular History:  Ocular trauma  Aphakia     Allergies:  Review of patient's allergies indicates:   Allergen Reactions    Aspirin     Hydrocodone Other (See Comments)    Hydromorphone     Azithromycin Nausea And Vomiting        Social History:  Social History     Socioeconomic History    Marital status:    Tobacco Use    Smoking status: Never Smoker    Smokeless tobacco: Never Used   Substance and Sexual Activity    Alcohol use: Yes     Alcohol/week: 3.0 standard drinks     Types: 3 Shots of liquor per week    Drug use: Never        Medications:  No current facility-administered medications for this encounter.    Current Outpatient Medications:     acetaZOLAMIDE (DIAMOX SEQUELS) 500 mg CpSR, Take 1 capsule (500 mg total) by mouth 2 (two) times daily., Disp: 60 capsule, Rfl: 11    AZOPT 1 % ophthalmic suspension, PLACE 1 DROP INTO THE RIGHT EYE 3 (THREE) TIMES DAILY., Disp: 10 mL, Rfl: 6    ciprofloxacin HCl (CIPRO) 500 MG tablet, ciprofloxacin 500 mg tablet, Disp: , Rfl:     COMBIGAN 0.2-0.5 % Drop, PLACE 1 DROP INTO THE RIGHT EYE 2 (TWO) TIMES DAILY., Disp: 5 mL, Rfl: 3    cyclobenzaprine (FLEXERIL) 5 MG tablet, cyclobenzaprine 5 mg  tablet, Disp: , Rfl:     erythromycin (ROMYCIN) ophthalmic ointment, PLACE 1/2 INCH RIBBON INTO RIGHT EYE EVERY DAY, Disp: , Rfl: 1    homatropine (ISOPTO HOMATROPINE) 5 % ophthalmic solution, Place 1 drop into the right eye 2 (two) times daily., Disp: , Rfl:     ibuprofen (ADVIL,MOTRIN) 100 MG tablet, Take 100 mg by mouth every 6 (six) hours as needed for Temperature greater than., Disp: , Rfl:     latanoprostene bunod (VYZULTA OPHT), Place 1 drop into the right eye once daily., Disp: , Rfl:     medroxyPROGESTERone (DEPO-PROVERA) 150 mg/mL Syrg, INJECT 1 ML INTRAMUSCULARLY AS DIRECTED, Disp: , Rfl: 6    melatonin 10 mg Cap, Take by mouth., Disp: , Rfl:     metroNIDAZOLE (FLAGYL) 500 MG tablet, metronidazole 500 mg tablet, Disp: , Rfl:     naproxen (NAPROSYN) 500 MG tablet, Take 500 mg by mouth 2 (two) times daily., Disp: , Rfl: 3    nepafenac (ILEVRO OPHT), Place 1 drop into the right eye once daily., Disp: , Rfl:     norgestimate-ethinyl estradiol (ORTHO-CYCLEN) 0.25-35 mg-mcg per tablet, Take 1 tablet by mouth once daily., Disp: 28 tablet, Rfl: 11    oseltamivir (TAMIFLU) 75 MG capsule, TAKE 1 CAPSULE BY MOUTH TWICE A DAY FOR 5 DAYS, Disp: , Rfl: 0    oxyCODONE-acetaminophen (PERCOCET)  mg per tablet, oxycodone-acetaminophen 10 mg-325 mg tablet, Disp: , Rfl:     pantoprazole (PROTONIX) 20 MG tablet, Take 20 mg by mouth., Disp: , Rfl:     promethazine (PHENERGAN) 25 MG tablet, promethazine 25 mg tablet, Disp: , Rfl:     tamsulosin (FLOMAX) 0.4 mg Cp24, tamsulosin 0.4 mg capsule, Disp: , Rfl:     timolol maleate 0.5% (TIMOPTIC) 0.5 % Drop, INSTILL ONE GTT LISA BID, Disp: , Rfl: 0    traMADol (ULTRAM) 50 mg tablet, tramadol 50 mg tablet, Disp: , Rfl:      Family History:  Family History   Problem Relation Age of Onset    Breast cancer Neg Hx     Colon cancer Neg Hx     Ovarian cancer Neg Hx         ROS:   Constitutional: WNL   Eyes: See HPI   Ears: WNL   CV: WNL   Resp: WNL   Gastro: WNL     Musculo: WNL   Skin: WNL   Neuro: WNL     Physical Exam:  See nursing intake for vitals    General: No acute distress  HEENT: NC/AT  CV: Pulses strong and equal bilaterally  Resp: Breathing comfortably on room air  Musculoskeletal: WNL, able to lay flat    Lab Results:  CBC w/Diff   No results found for: WBC, RBC, HGB, HCT, MCV, MCH, MCHC, RDW, MPV No components found for: NEUT, ANC, LYMA, ALC, ROGERIO, AMC, EOSA, AEC, BASA, ABC     Imaging:  None    Assessment:  1: 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 The patient was given the opportunity to have all questions answered. At the conclusion of our discussion, signed informed consent was obtained.     Delma Chaves M.D., M.S.  Department of Ophthalmology   Division of Glaucoma Surgery  [facility]

## 2022-03-30 ENCOUNTER — TELEPHONE (OUTPATIENT)
Dept: OPHTHALMOLOGY | Facility: CLINIC | Age: 52
End: 2022-03-30
Payer: MEDICAID

## 2022-03-30 NOTE — PRE-PROCEDURE INSTRUCTIONS
Preop instructions(bathing/wear loose fitting clothing/fasting/directions/location of surgery/ and preop medication instructions reviewed with patient). Clear liquids are allowed up to 2 hours before procedure.Clear liquids are:water,apple juice, jello, gatorade & powerade. Patient instructed to hold/stop all blood thinning medications, prior to surgery, following the pre-surgery recommended guidelines. Instructed to follow the surgeon's instructions if they differ from these.    Patient verbalized understanding.    Denies any  history of side effects or issues with anesthesia or sedation.    Patient advised of the updated visitor policy.  Patient aware of the need to have someone drive them home following same -day surgery.      Patient given arrival to [place] - md's office

## 2022-03-31 ENCOUNTER — ANESTHESIA EVENT (OUTPATIENT)
Dept: SURGERY | Facility: HOSPITAL | Age: 52
End: 2022-03-31
Payer: MEDICAID

## 2022-03-31 ENCOUNTER — ANESTHESIA (OUTPATIENT)
Dept: SURGERY | Facility: HOSPITAL | Age: 52
End: 2022-03-31
Payer: MEDICAID

## 2022-03-31 ENCOUNTER — HOSPITAL ENCOUNTER (OUTPATIENT)
Facility: HOSPITAL | Age: 52
Discharge: HOME | End: 2022-03-31
Attending: STUDENT IN AN ORGANIZED HEALTH CARE EDUCATION/TRAINING PROGRAM | Admitting: STUDENT IN AN ORGANIZED HEALTH CARE EDUCATION/TRAINING PROGRAM
Payer: MEDICAID

## 2022-03-31 VITALS
HEART RATE: 69 BPM | RESPIRATION RATE: 20 BRPM | TEMPERATURE: 98 F | WEIGHT: 151 LBS | DIASTOLIC BLOOD PRESSURE: 70 MMHG | BODY MASS INDEX: 28.53 KG/M2 | SYSTOLIC BLOOD PRESSURE: 122 MMHG | OXYGEN SATURATION: 97 %

## 2022-03-31 DIAGNOSIS — H40.31X3 GLAUCOMA OF RIGHT EYE ASSOCIATED WITH OCULAR TRAUMA, SEVERE STAGE: Primary | ICD-10-CM

## 2022-03-31 DIAGNOSIS — H40.31X3 TRAUMATIC GLAUCOMA, RIGHT EYE, SEVERE STAGE: ICD-10-CM

## 2022-03-31 PROCEDURE — 25000003 PHARM REV CODE 250: Performed by: STUDENT IN AN ORGANIZED HEALTH CARE EDUCATION/TRAINING PROGRAM

## 2022-03-31 PROCEDURE — 71000044 HC DOSC ROUTINE RECOVERY FIRST HOUR: Performed by: STUDENT IN AN ORGANIZED HEALTH CARE EDUCATION/TRAINING PROGRAM

## 2022-03-31 PROCEDURE — D9220A PRA ANESTHESIA: Mod: CRNA,,, | Performed by: STUDENT IN AN ORGANIZED HEALTH CARE EDUCATION/TRAINING PROGRAM

## 2022-03-31 PROCEDURE — D9220A PRA ANESTHESIA: ICD-10-PCS | Mod: ANES,,, | Performed by: STUDENT IN AN ORGANIZED HEALTH CARE EDUCATION/TRAINING PROGRAM

## 2022-03-31 PROCEDURE — 66180 PR WATER SHUNT-EXTRAOCUL RESERV: ICD-10-PCS | Mod: RT,,, | Performed by: STUDENT IN AN ORGANIZED HEALTH CARE EDUCATION/TRAINING PROGRAM

## 2022-03-31 PROCEDURE — C1762 CONN TISS, HUMAN(INC FASCIA): HCPCS | Performed by: STUDENT IN AN ORGANIZED HEALTH CARE EDUCATION/TRAINING PROGRAM

## 2022-03-31 PROCEDURE — D9220A PRA ANESTHESIA: ICD-10-PCS | Mod: CRNA,,, | Performed by: STUDENT IN AN ORGANIZED HEALTH CARE EDUCATION/TRAINING PROGRAM

## 2022-03-31 PROCEDURE — D9220A PRA ANESTHESIA: Mod: ANES,,, | Performed by: STUDENT IN AN ORGANIZED HEALTH CARE EDUCATION/TRAINING PROGRAM

## 2022-03-31 PROCEDURE — 66180 AQUEOUS SHUNT EYE W/GRAFT: CPT | Mod: RT,,, | Performed by: STUDENT IN AN ORGANIZED HEALTH CARE EDUCATION/TRAINING PROGRAM

## 2022-03-31 PROCEDURE — 63600175 PHARM REV CODE 636 W HCPCS: Performed by: STUDENT IN AN ORGANIZED HEALTH CARE EDUCATION/TRAINING PROGRAM

## 2022-03-31 PROCEDURE — 27200651 HC AIRWAY, LMA: Performed by: ANESTHESIOLOGY

## 2022-03-31 PROCEDURE — 00140 ANES PROCEDURES ON EYE NOS: CPT | Performed by: STUDENT IN AN ORGANIZED HEALTH CARE EDUCATION/TRAINING PROGRAM

## 2022-03-31 PROCEDURE — 71000015 HC POSTOP RECOV 1ST HR: Performed by: STUDENT IN AN ORGANIZED HEALTH CARE EDUCATION/TRAINING PROGRAM

## 2022-03-31 PROCEDURE — 25000003 PHARM REV CODE 250

## 2022-03-31 PROCEDURE — 36000707: Performed by: STUDENT IN AN ORGANIZED HEALTH CARE EDUCATION/TRAINING PROGRAM

## 2022-03-31 PROCEDURE — 37000009 HC ANESTHESIA EA ADD 15 MINS: Performed by: STUDENT IN AN ORGANIZED HEALTH CARE EDUCATION/TRAINING PROGRAM

## 2022-03-31 PROCEDURE — 37000008 HC ANESTHESIA 1ST 15 MINUTES: Performed by: STUDENT IN AN ORGANIZED HEALTH CARE EDUCATION/TRAINING PROGRAM

## 2022-03-31 PROCEDURE — 36000706: Performed by: STUDENT IN AN ORGANIZED HEALTH CARE EDUCATION/TRAINING PROGRAM

## 2022-03-31 PROCEDURE — C1783 OCULAR IMP, AQUEOUS DRAIN DE: HCPCS | Performed by: STUDENT IN AN ORGANIZED HEALTH CARE EDUCATION/TRAINING PROGRAM

## 2022-03-31 DEVICE — IMPLANTABLE DEVICE: Type: IMPLANTABLE DEVICE | Site: EYE | Status: FUNCTIONAL

## 2022-03-31 RX ORDER — LIDOCAINE HYDROCHLORIDE 10 MG/ML
INJECTION, SOLUTION EPIDURAL; INFILTRATION; INTRACAUDAL; PERINEURAL
Status: DISCONTINUED
Start: 2022-03-31 | End: 2022-03-31 | Stop reason: HOSPADM

## 2022-03-31 RX ORDER — MOXIFLOXACIN 5 MG/ML
SOLUTION/ DROPS OPHTHALMIC
Status: DISCONTINUED | OUTPATIENT
Start: 2022-03-31 | End: 2022-03-31 | Stop reason: HOSPADM

## 2022-03-31 RX ORDER — LIDOCAINE HCL/PF 100 MG/5ML
SYRINGE (ML) INTRAVENOUS
Status: DISCONTINUED | OUTPATIENT
Start: 2022-03-31 | End: 2022-03-31

## 2022-03-31 RX ORDER — PROPOFOL 10 MG/ML
INJECTION, EMULSION INTRAVENOUS
Status: DISCONTINUED | OUTPATIENT
Start: 2022-03-31 | End: 2022-03-31

## 2022-03-31 RX ORDER — GENTAMICIN SULFATE 40 MG/ML
INJECTION, SOLUTION INTRAMUSCULAR; INTRAVENOUS
Status: DISCONTINUED
Start: 2022-03-31 | End: 2022-03-31 | Stop reason: HOSPADM

## 2022-03-31 RX ORDER — DORZOLAMIDE HCL 20 MG/ML
1 SOLUTION/ DROPS OPHTHALMIC 3 TIMES DAILY
COMMUNITY
End: 2022-03-31

## 2022-03-31 RX ORDER — ONDANSETRON 2 MG/ML
INJECTION INTRAMUSCULAR; INTRAVENOUS
Status: DISCONTINUED | OUTPATIENT
Start: 2022-03-31 | End: 2022-03-31

## 2022-03-31 RX ORDER — FENTANYL CITRATE 50 UG/ML
INJECTION, SOLUTION INTRAMUSCULAR; INTRAVENOUS
Status: DISCONTINUED | OUTPATIENT
Start: 2022-03-31 | End: 2022-03-31

## 2022-03-31 RX ORDER — MOXIFLOXACIN 5 MG/ML
1 SOLUTION/ DROPS OPHTHALMIC
Status: DISPENSED | OUTPATIENT
Start: 2022-03-31

## 2022-03-31 RX ORDER — ATROPINE SULFATE 10 MG/ML
SOLUTION/ DROPS OPHTHALMIC
Status: DISCONTINUED | OUTPATIENT
Start: 2022-03-31 | End: 2022-03-31 | Stop reason: HOSPADM

## 2022-03-31 RX ORDER — MIDAZOLAM HYDROCHLORIDE 1 MG/ML
INJECTION INTRAMUSCULAR; INTRAVENOUS
Status: DISCONTINUED | OUTPATIENT
Start: 2022-03-31 | End: 2022-03-31

## 2022-03-31 RX ORDER — PREDNISOLONE ACETATE 10 MG/ML
SUSPENSION/ DROPS OPHTHALMIC
Status: DISCONTINUED
Start: 2022-03-31 | End: 2022-03-31 | Stop reason: HOSPADM

## 2022-03-31 RX ORDER — DEXAMETHASONE SODIUM PHOSPHATE 4 MG/ML
INJECTION, SOLUTION INTRA-ARTICULAR; INTRALESIONAL; INTRAMUSCULAR; INTRAVENOUS; SOFT TISSUE
Status: DISCONTINUED | OUTPATIENT
Start: 2022-03-31 | End: 2022-03-31

## 2022-03-31 RX ORDER — ONDANSETRON 2 MG/ML
4 INJECTION INTRAMUSCULAR; INTRAVENOUS DAILY PRN
Status: DISCONTINUED | OUTPATIENT
Start: 2022-03-31 | End: 2022-03-31 | Stop reason: HOSPADM

## 2022-03-31 RX ORDER — LIDOCAINE HYDROCHLORIDE 40 MG/ML
INJECTION, SOLUTION RETROBULBAR
Status: DISCONTINUED
Start: 2022-03-31 | End: 2022-03-31 | Stop reason: HOSPADM

## 2022-03-31 RX ORDER — LIDOCAINE HYDROCHLORIDE 10 MG/ML
INJECTION, SOLUTION EPIDURAL; INFILTRATION; INTRACAUDAL; PERINEURAL
Status: DISCONTINUED | OUTPATIENT
Start: 2022-03-31 | End: 2022-03-31 | Stop reason: HOSPADM

## 2022-03-31 RX ORDER — FENTANYL CITRATE 50 UG/ML
25 INJECTION, SOLUTION INTRAMUSCULAR; INTRAVENOUS EVERY 5 MIN PRN
Status: DISCONTINUED | OUTPATIENT
Start: 2022-03-31 | End: 2022-03-31 | Stop reason: HOSPADM

## 2022-03-31 RX ORDER — ACETAMINOPHEN 325 MG/1
650 TABLET ORAL EVERY 4 HOURS PRN
Status: DISCONTINUED | OUTPATIENT
Start: 2022-03-31 | End: 2022-03-31 | Stop reason: HOSPADM

## 2022-03-31 RX ORDER — PREDNISOLONE ACETATE 10 MG/ML
SUSPENSION/ DROPS OPHTHALMIC
Status: DISCONTINUED | OUTPATIENT
Start: 2022-03-31 | End: 2022-03-31 | Stop reason: HOSPADM

## 2022-03-31 RX ORDER — ATROPINE SULFATE 10 MG/ML
SOLUTION/ DROPS OPHTHALMIC
Status: DISCONTINUED
Start: 2022-03-31 | End: 2022-03-31 | Stop reason: HOSPADM

## 2022-03-31 RX ORDER — SODIUM CHLORIDE 9 MG/ML
INJECTION, SOLUTION INTRAVENOUS CONTINUOUS PRN
Status: DISCONTINUED | OUTPATIENT
Start: 2022-03-31 | End: 2022-03-31

## 2022-03-31 RX ORDER — GENTAMICIN SULFATE 40 MG/ML
INJECTION, SOLUTION INTRAMUSCULAR; INTRAVENOUS
Status: DISCONTINUED | OUTPATIENT
Start: 2022-03-31 | End: 2022-03-31 | Stop reason: HOSPADM

## 2022-03-31 RX ORDER — SODIUM CHLORIDE 0.9 % (FLUSH) 0.9 %
10 SYRINGE (ML) INJECTION
Status: ACTIVE | OUTPATIENT
Start: 2022-03-31

## 2022-03-31 RX ORDER — BRIMONIDINE TARTRATE 1.5 MG/ML
1 SOLUTION/ DROPS OPHTHALMIC 3 TIMES DAILY
Status: ON HOLD | COMMUNITY
End: 2022-03-31 | Stop reason: HOSPADM

## 2022-03-31 RX ORDER — SODIUM CHLORIDE 0.9 % (FLUSH) 0.9 %
10 SYRINGE (ML) INJECTION
Status: DISCONTINUED | OUTPATIENT
Start: 2022-03-31 | End: 2022-03-31 | Stop reason: HOSPADM

## 2022-03-31 RX ADMIN — SODIUM CHLORIDE: 0.9 INJECTION, SOLUTION INTRAVENOUS at 03:03

## 2022-03-31 RX ADMIN — DEXAMETHASONE SODIUM PHOSPHATE 4 MG: 4 INJECTION, SOLUTION INTRAMUSCULAR; INTRAVENOUS at 05:03

## 2022-03-31 RX ADMIN — FENTANYL CITRATE 50 MCG: 50 INJECTION, SOLUTION INTRAMUSCULAR; INTRAVENOUS at 04:03

## 2022-03-31 RX ADMIN — PROPOFOL 100 MG: 10 INJECTION, EMULSION INTRAVENOUS at 04:03

## 2022-03-31 RX ADMIN — ACETAMINOPHEN 650 MG: 325 TABLET ORAL at 06:03

## 2022-03-31 RX ADMIN — MOXIFLOXACIN 1 DROP: 5 SOLUTION/ DROPS OPHTHALMIC at 02:03

## 2022-03-31 RX ADMIN — ONDANSETRON 4 MG: 2 INJECTION, SOLUTION INTRAMUSCULAR; INTRAVENOUS at 05:03

## 2022-03-31 RX ADMIN — MIDAZOLAM HYDROCHLORIDE 2 MG: 1 INJECTION, SOLUTION INTRAMUSCULAR; INTRAVENOUS at 04:03

## 2022-03-31 RX ADMIN — LIDOCAINE HYDROCHLORIDE 60 MG: 20 INJECTION, SOLUTION INTRAVENOUS at 04:03

## 2022-03-31 NOTE — DISCHARGE SUMMARY
[facility] - Surgery (1st Fl)  Discharge Note  Short Stay    Procedure(s) (LRB):  INSERTION, DEVICE, FOR GLAUCOMA/  RIGHT EYE (Right)    OUTCOME: Patient tolerated treatment/procedure well without complication and is now ready for discharge.    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

## 2022-03-31 NOTE — ANESTHESIA PREPROCEDURE EVALUATION
03/31/2022  Shital Duong is a 52 y.o., female.      Pre-op Assessment          Review of Systems  Anesthesia Hx:  No problems with previous Anesthesia    Social:  Non-Smoker    EENT/Dental:   Aphakia, right  Amblyopia, right  Ocular hypertension, right  Traumatic glaucoma, right eye       Cardiovascular:  Cardiovascular Normal Exercise tolerance: good     Renal/:  Renal/ Normal     Hepatic/GI:   Gastric sleeve   Musculoskeletal:  Musculoskeletal Normal    Neurological:  Neurology Normal        Physical Exam  General: Well nourished, Cooperative and Alert    Airway:  Mouth Opening: Normal  TM Distance: Normal  Tongue: Normal        Anesthesia Plan  Type of Anesthesia, risks & benefits discussed:    Anesthesia Type: Gen Supraglottic Airway, Gen ETT  Intra-op Monitoring Plan: Standard ASA Monitors  Induction:  IV  Informed Consent: Informed consent signed with the Patient and all parties understand the risks and agree with anesthesia plan.  All questions answered.   ASA Score: 2  Day of Surgery Review of History & Physical: H&P Update referred to the surgeon/provider.    Ready For Surgery From Anesthesia Perspective.     .

## 2022-03-31 NOTE — PROGRESS NOTES
Subjective:       Patient ID: Shital Duong is a 52 y.o. female.    Chief Complaint: Post-op Evaluation (1 day post op OD)     HPI     Post-op Evaluation     Comments: 1 day post op OD              Comments     52 year old female is here today for 1 day post op. Patient stated is   doing well . She stated it hurts a little.   Patient not on any drops but does have Isoto Atropine      PROCEDURE PERFORMED:           Ahmed glaucoma shunt implant FP-7 with a   scleral patch graft in the superior temporal quadrant of the right eye              Last edited by Mariusz Payne MA on 4/1/2022  8:51 AM. 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, severe stage    Aphakia, right    Retinal detachment of right eye with single break    Amblyopia, right    POD#1 GDI OD  IOP great  Cornea clear  Start   PF QID    Vigamox QID    Atropine BID  No lifting over 10 pounds x 6 weeks  No bending, no straining  OK to shower, but no water in the eye  Discussed if any worsening vision, worsening pain, discharge or drainage, call immediately  Post op handout given and all questions answered        Glaucoma associated with ocular trauma OD - severe state  Aphakic OD  APD OD  Aphakic after trauma at 1 y/o  Presented to [facility] 2/16/22 with IOP to 70s  Had been out of drops  -Fhx (), Steroids (),Trauma(+)  -Drops:--> stop all gtts   -Drop intolerance/contraindication:  -Laser:  -Surgeries: RD repair, cataract extraction OD // GDI OD  -CCT: 653 // 603  -Gonio:    Pt c/o ear ringing and parasthesias with diamox  Trouble with taking drops  Many trips to ED for pain  Has followed for many years with   Aphakic but post vitrectomy        Aphakia OD  Injury to right eye as a child with subsequent cataract formation.   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      Hx RRD  OD  s/p 25g PPV/EL/AFx/SF6 OD 3/21/18  Dr. Wilson     Functionally Monocular  Discussed monocular status with patient and increased risk of damage to well-seeing eye  Recommend protective eyewear at all times  Any MRx should be written with polycarbonate lenses    Amblyopia OD  Fairly dense given early onset of Va loss             PLAN  Start   PF QID    Vigamox QID    Atropine BID  Stop all glc gtts and diamox    RTC 1 week IOP    Delmamarsha Chaves M.D., M.S.  Department of Ophthalmology   Division of Glaucoma Surgery  [facility]

## 2022-03-31 NOTE — ANESTHESIA PROCEDURE NOTES
Intubation    Date/Time: 3/31/2022 5:08 PM  Performed by: Kimberli King CRNA  Authorized by: Steven Moreira MD     Intubation:     Intubated:  Postinduction    Mask Ventilation:  N/a    Attempts:  1    Attempted By:  CRNA    Difficult Airway Encountered?: No      Complications:  None    Airway Device:  Supraglottic airway/LMA    Airway Device Size:  3.5    Style/Cuff Inflation:  Cuffed (inflated to minimal occlusive pressure)    Secured at:  The lips    Placement Verified By:  Capnometry    Complicating Factors:  None    Findings Post-Intubation:  BS equal bilateral and atraumatic/condition of teeth unchanged

## 2022-03-31 NOTE — PATIENT INSTRUCTIONS
If patched  Keep patch on eye until it is removed in clinic tomorrow  Bring your grey bag of medications with you tomorrow  No lifting over 10 pounds  No bending, no straining  OK to shower, but do not get patch wet  If mild pain, take Tylenol 1000 mg  (do not exceed 4000 mg per day)  Call clinic if severe pain [phone] or  [phone]

## 2022-03-31 NOTE — OP NOTE
Operative Date:  03/31/2022    Discharge Date:  03/31/2022    Discharge Patient Home    SURGEON:  Delma Chaves MD MS    ASSISTANT: -    PREOPERATIVE DIAGNOSIS: Poorly controlled advanced traumatic glaucoma of the right eye    POSTOPERATIVE DIAGNOSIS: Poorly controlled advanced traumatic glaucoma of the right eye    PROCEDURE PERFORMED: Ahmed glaucoma shunt implant FP-7 with a scleral patch graft in the superior temporal quadrant of the right eye    COMPLICATIONS: None.    ESTIMATED BLOOD LOSS: Minimal.    ANESTHESIA:  GETA    PROCEDURE IN DETIAL: The patient and correct eye to be operated on were identified by the operating surgeon and the patient was brought into the operating room. The patient received topical anesthesia and then prepped and draped in the standard sterile fashion.    A superotemporal / infranasal fornix-based conjunctival peritomy was performed with Vannas and Cornelio scissor dissection.  The  implant was inspected and primed with a 30-gauge cannula on a BSS syringe.  A sub-Tenons pocket was formed, the rectus muscles were identified on successive throws with muscle hooks and the shunt implant was then placed in the sub-Tenons space without difficulty.  The implant was fixed to the globe 9 mm posterior to the limbus using 7-0 Vicryl sutures.  The tubing was then cut to size. The eye was first entered at the paracentesis site and then the eye was reentered to form a stent tract site using a 23-gauge butterfly needle.  The shunt tubing was placed in the anterior chamber in good position through this tract without difficulty.  The silicone stent was fixated to the globe using interrupted 8-0 Vicryl suture and a pericardial patch graft was cut to shape, fitting well over the entrance site and tubing length and affixed to the globe with 8-0 Vicryl suture.  The conjunctiva was then secured to the limbus in a watertight fashion using 8-0 Vicryl sutures.  The anterior chamber was well formed  throughout the case and there were no complications.  Provisc was injected in the anterior chamber. Following the procedure, a collagen shield soaked in vigamox and prednisone 1% along with a drop atropine 1% was placed on the cornea.  The eye was closed, patched, and a Zeng shield was placed.  The patient was taken to the recovery room in good and stable condition.  The patient tolerated the procedure well.  The patient  was instructed to refrain from any heavy lifting, bending, stooping, or straining activities, discharge Home and to follow up in the morning for routine postoperative care.

## 2022-04-01 ENCOUNTER — OFFICE VISIT (OUTPATIENT)
Dept: OPHTHALMOLOGY | Facility: CLINIC | Age: 52
End: 2022-04-01
Payer: MEDICAID

## 2022-04-01 DIAGNOSIS — Z98.890 POST-OPERATIVE STATE: Primary | ICD-10-CM

## 2022-04-01 DIAGNOSIS — H53.001 AMBLYOPIA, RIGHT: ICD-10-CM

## 2022-04-01 DIAGNOSIS — H40.31X3 GLAUCOMA OF RIGHT EYE ASSOCIATED WITH OCULAR TRAUMA, SEVERE STAGE: ICD-10-CM

## 2022-04-01 DIAGNOSIS — H33.011 RETINAL DETACHMENT OF RIGHT EYE WITH SINGLE BREAK: ICD-10-CM

## 2022-04-01 DIAGNOSIS — H27.01 APHAKIA, RIGHT: ICD-10-CM

## 2022-04-01 PROCEDURE — 1160F PR REVIEW ALL MEDS BY PRESCRIBER/CLIN PHARMACIST DOCUMENTED: ICD-10-PCS | Mod: CPTII,,, | Performed by: STUDENT IN AN ORGANIZED HEALTH CARE EDUCATION/TRAINING PROGRAM

## 2022-04-01 PROCEDURE — 99212 OFFICE O/P EST SF 10 MIN: CPT | Mod: PBBFAC | Performed by: STUDENT IN AN ORGANIZED HEALTH CARE EDUCATION/TRAINING PROGRAM

## 2022-04-01 PROCEDURE — 99999 PR PBB SHADOW E&M-EST. PATIENT-LVL II: CPT | Mod: PBBFAC,,, | Performed by: STUDENT IN AN ORGANIZED HEALTH CARE EDUCATION/TRAINING PROGRAM

## 2022-04-01 PROCEDURE — 1159F MED LIST DOCD IN RCRD: CPT | Mod: CPTII,,, | Performed by: STUDENT IN AN ORGANIZED HEALTH CARE EDUCATION/TRAINING PROGRAM

## 2022-04-01 PROCEDURE — 99024 POSTOP FOLLOW-UP VISIT: CPT | Mod: ,,, | Performed by: STUDENT IN AN ORGANIZED HEALTH CARE EDUCATION/TRAINING PROGRAM

## 2022-04-01 PROCEDURE — 1159F PR MEDICATION LIST DOCUMENTED IN MEDICAL RECORD: ICD-10-PCS | Mod: CPTII,,, | Performed by: STUDENT IN AN ORGANIZED HEALTH CARE EDUCATION/TRAINING PROGRAM

## 2022-04-01 PROCEDURE — 99999 PR PBB SHADOW E&M-EST. PATIENT-LVL II: ICD-10-PCS | Mod: PBBFAC,,, | Performed by: STUDENT IN AN ORGANIZED HEALTH CARE EDUCATION/TRAINING PROGRAM

## 2022-04-01 PROCEDURE — 1160F RVW MEDS BY RX/DR IN RCRD: CPT | Mod: CPTII,,, | Performed by: STUDENT IN AN ORGANIZED HEALTH CARE EDUCATION/TRAINING PROGRAM

## 2022-04-01 PROCEDURE — 99024 PR POST-OP FOLLOW-UP VISIT: ICD-10-PCS | Mod: ,,, | Performed by: STUDENT IN AN ORGANIZED HEALTH CARE EDUCATION/TRAINING PROGRAM

## 2022-04-01 NOTE — ANESTHESIA POSTPROCEDURE EVALUATION
Anesthesia Post Evaluation    Patient: Shital Duong    Procedure(s) Performed: Procedure(s) (LRB):  INSERTION, DEVICE, FOR GLAUCOMA/  RIGHT EYE (Right)    Final Anesthesia Type: general      Patient location during evaluation: PACU  Patient participation: Yes- Able to Participate  Level of consciousness: awake and alert, awake and oriented  Post-procedure vital signs: reviewed and stable  Pain management: adequate  Airway patency: patent    PONV status at discharge: No PONV  Anesthetic complications: no      Cardiovascular status: blood pressure returned to baseline, hemodynamically stable and stable  Respiratory status: unassisted, spontaneous ventilation and room air  Hydration status: euvolemic  Follow-up not needed.          Vitals Value Taken Time   /70 03/31/22 1901   Temp 98 04/01/22 0626   Pulse 63 03/31/22 1912   Resp 16 03/31/22 1912   SpO2 100 % 03/31/22 1912   Vitals shown include unvalidated device data.      No case tracking events are documented in the log.      Pain/Tatiana Score: Pain Rating Prior to Med Admin: 7 (3/31/2022  6:54 PM)  Tatiana Score: 10 (3/31/2022  6:54 PM)

## 2022-04-01 NOTE — PLAN OF CARE
Patient states they are ready to be discharged. Instructions given to patient and family. Both verbalize understanding. Patient tolerating po liquids with no difficulty. Patient states pain is at a tolerable level for them. Anesthesia consent and surgical consent in chart upon patient's discharge from [facility].

## 2022-04-04 ENCOUNTER — TELEPHONE (OUTPATIENT)
Dept: OPHTHALMOLOGY | Facility: CLINIC | Age: 52
End: 2022-04-04
Payer: MEDICAID

## 2022-04-04 ENCOUNTER — TELEPHONE (OUTPATIENT)
Dept: OPTOMETRY | Facility: CLINIC | Age: 52
End: 2022-04-04
Payer: MEDICAID

## 2022-04-04 NOTE — TELEPHONE ENCOUNTER
----- Message from Mg Mansfield sent at 4/4/2022  3:14 PM CDT -----  Regarding: Advice  Pt states she has been experiencing severe headaches and eye pressure for two days and would like advice.    Call # [phone]

## 2022-04-04 NOTE — TELEPHONE ENCOUNTER
Pt called complaining of head pain and pain along the bridge since having sx.   Pt wished to be checked   Pt will see Dr. Chaves tomorrow at the [facility].

## 2022-04-05 ENCOUNTER — OFFICE VISIT (OUTPATIENT)
Dept: OPHTHALMOLOGY | Facility: CLINIC | Age: 52
End: 2022-04-05
Payer: MEDICAID

## 2022-04-05 ENCOUNTER — TELEPHONE (OUTPATIENT)
Dept: OPHTHALMOLOGY | Facility: CLINIC | Age: 52
End: 2022-04-05
Payer: MEDICAID

## 2022-04-05 DIAGNOSIS — Z98.890 POST-OPERATIVE STATE: Primary | ICD-10-CM

## 2022-04-05 DIAGNOSIS — H53.001 AMBLYOPIA, RIGHT: ICD-10-CM

## 2022-04-05 DIAGNOSIS — H40.31X3 GLAUCOMA OF RIGHT EYE ASSOCIATED WITH OCULAR TRAUMA, SEVERE STAGE: ICD-10-CM

## 2022-04-05 DIAGNOSIS — Z96.89 HISTORY OF GLAUCOMA TUBE SHUNT PROCEDURE: Primary | ICD-10-CM

## 2022-04-05 DIAGNOSIS — H27.01 APHAKIA, RIGHT: ICD-10-CM

## 2022-04-05 DIAGNOSIS — Z86.69 HISTORY OF RETINAL DETACHMENT: ICD-10-CM

## 2022-04-05 PROCEDURE — 99024 PR POST-OP FOLLOW-UP VISIT: ICD-10-PCS | Mod: ,,, | Performed by: OPHTHALMOLOGY

## 2022-04-05 PROCEDURE — 99999 PR PBB SHADOW E&M-EST. PATIENT-LVL II: CPT | Mod: PBBFAC,,, | Performed by: OPHTHALMOLOGY

## 2022-04-05 PROCEDURE — 99212 OFFICE O/P EST SF 10 MIN: CPT | Mod: PBBFAC | Performed by: OPHTHALMOLOGY

## 2022-04-05 PROCEDURE — 99999 PR PBB SHADOW E&M-EST. PATIENT-LVL II: ICD-10-PCS | Mod: PBBFAC,,, | Performed by: OPHTHALMOLOGY

## 2022-04-05 PROCEDURE — 1159F PR MEDICATION LIST DOCUMENTED IN MEDICAL RECORD: ICD-10-PCS | Mod: CPTII,,, | Performed by: OPHTHALMOLOGY

## 2022-04-05 PROCEDURE — 1160F RVW MEDS BY RX/DR IN RCRD: CPT | Mod: CPTII,,, | Performed by: OPHTHALMOLOGY

## 2022-04-05 PROCEDURE — 1160F PR REVIEW ALL MEDS BY PRESCRIBER/CLIN PHARMACIST DOCUMENTED: ICD-10-PCS | Mod: CPTII,,, | Performed by: OPHTHALMOLOGY

## 2022-04-05 PROCEDURE — 1159F MED LIST DOCD IN RCRD: CPT | Mod: CPTII,,, | Performed by: OPHTHALMOLOGY

## 2022-04-05 PROCEDURE — 99024 POSTOP FOLLOW-UP VISIT: CPT | Mod: ,,, | Performed by: OPHTHALMOLOGY

## 2022-04-05 RX ORDER — ATROPINE SULFATE 10 MG/ML
1 SOLUTION/ DROPS OPHTHALMIC 2 TIMES DAILY
COMMUNITY
End: 2022-05-25

## 2022-04-05 RX ORDER — ZOLPIDEM TARTRATE 5 MG/1
5 TABLET ORAL NIGHTLY PRN
COMMUNITY
Start: 2022-03-24

## 2022-04-05 RX ORDER — PREDNISOLONE ACETATE 10 MG/ML
1 SUSPENSION/ DROPS OPHTHALMIC 3 TIMES DAILY
COMMUNITY
End: 2022-04-05 | Stop reason: SDUPTHER

## 2022-04-05 RX ORDER — MOXIFLOXACIN 5 MG/ML
1 SOLUTION/ DROPS OPHTHALMIC 3 TIMES DAILY
COMMUNITY
End: 2022-05-25

## 2022-04-05 RX ORDER — PREDNISOLONE ACETATE 10 MG/ML
1 SUSPENSION/ DROPS OPHTHALMIC 3 TIMES DAILY
Qty: 5 ML | Refills: 2 | Status: SHIPPED | OUTPATIENT
Start: 2022-04-05 | End: 2022-05-04 | Stop reason: SDUPTHER

## 2022-04-05 NOTE — PROGRESS NOTES
"Subjective:       Patient ID: Shital Duong is a 52 y.o. female.    Chief Complaint: Post-op Evaluation (OD red/swollen x's 2 days) and Strabismus     HPI     Post-op Evaluation      Additional comments: OD red/swollen x's 2 days              Comments     Patient states her RUL has been red and swollen for about 2 days now. No   pain-just a "pressure" feeling. No discharge.      PROCEDURE PERFORMED:           Ahmed glaucoma shunt implant FP-7 with a   scleral patch graft in the superior temporal quadrant of the right eye              Last edited by Eduardo Garcia on 4/5/2022 12:40 PM. 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  The patient and family voice good understanding and will return as scheduled or sooner as needed.    Consider Jn     IOP great  Cornea clear     PF QID--> CSM   Vigamox QID --> Finish   Atropine BID --> feels HA / Flush --. Hold    No lifting over 10 pounds x 6 weeks  No bending, no straining  OK to shower, but no water in the eye  Discussed if any worsening vision, worsening pain, discharge or drainage, call immediately  Post op handout given and all questions answered        Glaucoma associated with ocular trauma OD - severe state  Aphakic OD  APD OD  Aphakic after trauma at 1 y/o  Presented to [facility] 2/16/22 with IOP to 70s  Had been out of drops  -Fhx (), Steroids (),Trauma(+)  -Drops:--> stop all gtts   -Drop intolerance/contraindication:  -Laser:  -Surgeries: RD repair, cataract extraction OD // GDI OD  -CCT: 653 // 603  -Gonio:    Pt c/o ear ringing and " parasthesias with diamox  Trouble with taking drops  Many trips to ED for pain  Has followed for many years with   Aphakic but post vitrectomy        Aphakia OD  Injury to right eye as a child with subsequent cataract formation.   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      Hx RRD OD  s/p 25g PPV/EL/AFx/SF6 OD 3/21/18  Dr. Wilson     Functionally Monocular  Discussed monocular status with patient and increased risk of damage to well-seeing eye  Recommend protective eyewear at all times  Any MRx should be written with polycarbonate lenses    Amblyopia OD  Fairly dense given early onset of Va loss             PLAN  RTC 1 week IOP with Dr Chaves  RTC sooner prn with good understanding    Delma Chaves M.D., M.S.  Department of Ophthalmology   Division of Glaucoma Surgery  [facility]

## 2022-04-05 NOTE — TELEPHONE ENCOUNTER
----- Message from Delma Lopez sent at 4/5/2022  8:17 AM CDT -----  Regarding: URGENT  Contact: Shital @[phone]  Pt states she is having some swelling and wanted to know if she can come in to have it looked at

## 2022-04-07 ENCOUNTER — PATIENT MESSAGE (OUTPATIENT)
Dept: OPHTHALMOLOGY | Facility: CLINIC | Age: 52
End: 2022-04-07
Payer: MEDICAID

## 2022-04-08 ENCOUNTER — OFFICE VISIT (OUTPATIENT)
Dept: OPHTHALMOLOGY | Facility: CLINIC | Age: 52
End: 2022-04-08
Payer: MEDICAID

## 2022-04-08 ENCOUNTER — TELEPHONE (OUTPATIENT)
Dept: OPHTHALMOLOGY | Facility: CLINIC | Age: 52
End: 2022-04-08
Payer: MEDICAID

## 2022-04-08 DIAGNOSIS — H27.01 APHAKIA, RIGHT: ICD-10-CM

## 2022-04-08 DIAGNOSIS — Z86.69 HISTORY OF RETINAL DETACHMENT: ICD-10-CM

## 2022-04-08 DIAGNOSIS — H40.31X3 GLAUCOMA OF RIGHT EYE ASSOCIATED WITH OCULAR TRAUMA, SEVERE STAGE: ICD-10-CM

## 2022-04-08 DIAGNOSIS — Z96.89 HISTORY OF GLAUCOMA TUBE SHUNT PROCEDURE: Primary | ICD-10-CM

## 2022-04-08 PROCEDURE — 1160F RVW MEDS BY RX/DR IN RCRD: CPT | Mod: CPTII,,, | Performed by: OPHTHALMOLOGY

## 2022-04-08 PROCEDURE — 99024 POSTOP FOLLOW-UP VISIT: CPT | Mod: ,,, | Performed by: OPHTHALMOLOGY

## 2022-04-08 PROCEDURE — 99999 PR PBB SHADOW E&M-EST. PATIENT-LVL III: CPT | Mod: PBBFAC,,, | Performed by: OPHTHALMOLOGY

## 2022-04-08 PROCEDURE — 1160F PR REVIEW ALL MEDS BY PRESCRIBER/CLIN PHARMACIST DOCUMENTED: ICD-10-PCS | Mod: CPTII,,, | Performed by: OPHTHALMOLOGY

## 2022-04-08 PROCEDURE — 99213 OFFICE O/P EST LOW 20 MIN: CPT | Mod: PBBFAC | Performed by: OPHTHALMOLOGY

## 2022-04-08 PROCEDURE — 99999 PR PBB SHADOW E&M-EST. PATIENT-LVL III: ICD-10-PCS | Mod: PBBFAC,,, | Performed by: OPHTHALMOLOGY

## 2022-04-08 PROCEDURE — 1159F PR MEDICATION LIST DOCUMENTED IN MEDICAL RECORD: ICD-10-PCS | Mod: CPTII,,, | Performed by: OPHTHALMOLOGY

## 2022-04-08 PROCEDURE — 1159F MED LIST DOCD IN RCRD: CPT | Mod: CPTII,,, | Performed by: OPHTHALMOLOGY

## 2022-04-08 PROCEDURE — 99024 PR POST-OP FOLLOW-UP VISIT: ICD-10-PCS | Mod: ,,, | Performed by: OPHTHALMOLOGY

## 2022-04-08 NOTE — PROGRESS NOTES
"Subjective:       Patient ID: Shital Duong is a 52 y.o. female.    Chief Complaint: Post-op Evaluation (3 day po chk)     HPI     Post-op Evaluation      Additional comments: 3 day po chk              Comments     Patient states her RUL has been red and swollen for about 1 wk now. No   pain-just a "pressure" feeling. No discharge.      PROCEDURE PERFORMED:           Ahmed glaucoma shunt implant FP-7 with a   scleral patch graft in the superior temporal quadrant of the right eye    Vig TID OD  PF TID OD  Systane Ultra TID OD              Last edited by Eduardo Garcia on 4/8/2022 12:31 PM. 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  The patient and family voice good understanding and will return as scheduled or sooner as needed.        Consider Jn     PF QID--> CSM   Pataday --> consider with vicryl sensitivity as discussed   Vigamox QID --> Finish   Atropine BID --> feels HA / Flush --> Holding --> Improved    No lifting over 10 pounds x 6 weeks  No bending, no straining  OK to shower, but no water in the eye  Discussed if any worsening vision, worsening pain, discharge or drainage, call immediately  Post op handout given and all questions answered        Glaucoma associated with ocular trauma OD - severe state  Aphakic OD  APD OD  Aphakic after trauma at 1 y/o  Presented to [facility] 2/16/22 with IOP to 70s  Had been out of drops  -Fhx (), Steroids (),Trauma(+)  -Drops:--> stop all gtts   -Drop intolerance/contraindication:  -Laser:  -Surgeries: RD repair, cataract extraction OD // GDI OD  -CCT: 653 // " 603  -Gonio:    Pt c/o ear ringing and parasthesias with diamox  Trouble with taking drops  Many trips to ED for pain  Has followed for many years with   Aphakic but post vitrectomy        Aphakia OD  Injury to right eye as a child with subsequent cataract formation.   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      Hx RRD OD  s/p 25g PPV/EL/AFx/SF6 OD 3/21/18  Dr. Wilson     Functionally Monocular  Discussed monocular status with patient and increased risk of damage to well-seeing eye  Recommend protective eyewear at all times  Any MRx should be written with polycarbonate lenses    Amblyopia OD  Fairly dense given early onset of Va loss             PLAN  RTC 1-2  weeks IOP with Dr Chaves  RTC sooner prn with good understanding

## 2022-04-08 NOTE — TELEPHONE ENCOUNTER
Patient called with complaints of eyelid swelling of right eye. Notes pain has not increased since last visit with Dr. Perez 4/5/22. Denies changes in sensitivity to light. No discharge. Patient uploaded photograph to media. Discussed with patient that she can use her tan colored eye drop (antibiotic) for extra doses this evening until she sees Dr. Perez tomorrow at noon. Instructed patient to promptly visit ED if there is any change in vision, symptoms worsen, or to call back if patient has any other concerns.

## 2022-04-12 ENCOUNTER — PATIENT MESSAGE (OUTPATIENT)
Dept: OPHTHALMOLOGY | Facility: CLINIC | Age: 52
End: 2022-04-12
Payer: MEDICAID

## 2022-04-12 NOTE — TELEPHONE ENCOUNTER
Called patient. She has a history of allergy to Vicryl suture that she previously didn't disclose. She will arrive to [facility] tomorrow ay 7:45 am for Kontur lens placement before clinic starts. Please add to my book. She does not need technician workup.     Thanks!

## 2022-04-13 ENCOUNTER — OFFICE VISIT (OUTPATIENT)
Dept: OPHTHALMOLOGY | Facility: CLINIC | Age: 52
End: 2022-04-13
Payer: MEDICAID

## 2022-04-13 DIAGNOSIS — Z96.89 HISTORY OF GLAUCOMA TUBE SHUNT PROCEDURE: Primary | ICD-10-CM

## 2022-04-13 DIAGNOSIS — Z98.890 POST-OPERATIVE STATE: ICD-10-CM

## 2022-04-13 DIAGNOSIS — H40.31X3 GLAUCOMA OF RIGHT EYE ASSOCIATED WITH OCULAR TRAUMA, SEVERE STAGE: ICD-10-CM

## 2022-04-13 PROCEDURE — 1159F PR MEDICATION LIST DOCUMENTED IN MEDICAL RECORD: ICD-10-PCS | Mod: CPTII,,, | Performed by: STUDENT IN AN ORGANIZED HEALTH CARE EDUCATION/TRAINING PROGRAM

## 2022-04-13 PROCEDURE — 99024 POSTOP FOLLOW-UP VISIT: CPT | Mod: ,,, | Performed by: STUDENT IN AN ORGANIZED HEALTH CARE EDUCATION/TRAINING PROGRAM

## 2022-04-13 PROCEDURE — 1159F MED LIST DOCD IN RCRD: CPT | Mod: CPTII,,, | Performed by: STUDENT IN AN ORGANIZED HEALTH CARE EDUCATION/TRAINING PROGRAM

## 2022-04-13 PROCEDURE — 99999 PR PBB SHADOW E&M-EST. PATIENT-LVL II: ICD-10-PCS | Mod: PBBFAC,,, | Performed by: STUDENT IN AN ORGANIZED HEALTH CARE EDUCATION/TRAINING PROGRAM

## 2022-04-13 PROCEDURE — 99024 PR POST-OP FOLLOW-UP VISIT: ICD-10-PCS | Mod: ,,, | Performed by: STUDENT IN AN ORGANIZED HEALTH CARE EDUCATION/TRAINING PROGRAM

## 2022-04-13 PROCEDURE — 99999 PR PBB SHADOW E&M-EST. PATIENT-LVL II: CPT | Mod: PBBFAC,,, | Performed by: STUDENT IN AN ORGANIZED HEALTH CARE EDUCATION/TRAINING PROGRAM

## 2022-04-13 PROCEDURE — 99212 OFFICE O/P EST SF 10 MIN: CPT | Mod: PBBFAC | Performed by: STUDENT IN AN ORGANIZED HEALTH CARE EDUCATION/TRAINING PROGRAM

## 2022-04-13 PROCEDURE — 1160F PR REVIEW ALL MEDS BY PRESCRIBER/CLIN PHARMACIST DOCUMENTED: ICD-10-PCS | Mod: CPTII,,, | Performed by: STUDENT IN AN ORGANIZED HEALTH CARE EDUCATION/TRAINING PROGRAM

## 2022-04-13 PROCEDURE — 1160F RVW MEDS BY RX/DR IN RCRD: CPT | Mod: CPTII,,, | Performed by: STUDENT IN AN ORGANIZED HEALTH CARE EDUCATION/TRAINING PROGRAM

## 2022-04-13 NOTE — PROGRESS NOTES
Subjective:       Patient ID: Shital Duong is a 52 y.o. female.    Chief Complaint: No chief complaint on file.    HPI     Continued eyelid swelling after GDI.    Last edited by Delma Chaves MD on 4/13/2022  8:21 AM. 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, severe stage    Post-operative state       Vicryl suture allergy   Noted previously with prior surgeries and required early suture removal  Removed running sutures at slit lamp   Placed Kontour lens  Vigamox TID while CTL in place  RTC 1 week for CTL removal    POW#2 GDI OD  IOP great  Cornea clear  Continue   PF QID    Vigamox QID    Atropine BID  No lifting over 10 pounds x 6 weeks  No bending, no straining  OK to shower, but no water in the eye  Discussed if any worsening vision, worsening pain, discharge or drainage, call immediately  Post op handout given and all questions answered        Glaucoma associated with ocular trauma OD - severe state  Aphakic OD  APD OD  Aphakic after trauma at 1 y/o  Presented to [facility] 2/16/22 with IOP to 70s  Had been out of drops  -Fhx (), Steroids (),Trauma(+)  -Drops:--> off glaucoma gtts   -Drop intolerance/contraindication:  -Laser:  -Surgeries: RD repair, cataract extraction OD // GDI OD  -CCT: 653 // 603  -Gonio:    Aphakia OD  Injury to right eye as a child with subsequent cataract formation.   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      Hx RRD OD  s/p 25g PPV/EL/AFx/SF6 OD 3/21/18  Dr. Wilson     Functionally Monocular  Discussed monocular status with patient and increased risk of damage to well-seeing eye  Recommend protective eyewear at all times  Any MRx should be written with polycarbonate lenses    Amblyopia OD  Fairly dense given early onset of Va loss      PLAN  PF QID   Vigamox QID     Suture  removal  Vigamox TID while CTL in place    RTC 1 week for CTL removal          Delma Chaves M.D., M.S.  Department of Ophthalmology   Division of Glaucoma Surgery  [facility]

## 2022-04-18 ENCOUNTER — OFFICE VISIT (OUTPATIENT)
Dept: OPHTHALMOLOGY | Facility: CLINIC | Age: 52
End: 2022-04-18
Payer: MEDICAID

## 2022-04-18 ENCOUNTER — PATIENT MESSAGE (OUTPATIENT)
Dept: OPHTHALMOLOGY | Facility: CLINIC | Age: 52
End: 2022-04-18

## 2022-04-18 DIAGNOSIS — Z96.89 HISTORY OF GLAUCOMA TUBE SHUNT PROCEDURE: Primary | ICD-10-CM

## 2022-04-18 DIAGNOSIS — Z98.890 POST-OPERATIVE STATE: ICD-10-CM

## 2022-04-18 DIAGNOSIS — H40.31X3 GLAUCOMA OF RIGHT EYE ASSOCIATED WITH OCULAR TRAUMA, SEVERE STAGE: ICD-10-CM

## 2022-04-18 PROCEDURE — 1159F MED LIST DOCD IN RCRD: CPT | Mod: CPTII,,, | Performed by: STUDENT IN AN ORGANIZED HEALTH CARE EDUCATION/TRAINING PROGRAM

## 2022-04-18 PROCEDURE — 99024 PR POST-OP FOLLOW-UP VISIT: ICD-10-PCS | Mod: ,,, | Performed by: STUDENT IN AN ORGANIZED HEALTH CARE EDUCATION/TRAINING PROGRAM

## 2022-04-18 PROCEDURE — 99024 POSTOP FOLLOW-UP VISIT: CPT | Mod: ,,, | Performed by: STUDENT IN AN ORGANIZED HEALTH CARE EDUCATION/TRAINING PROGRAM

## 2022-04-18 PROCEDURE — 1160F RVW MEDS BY RX/DR IN RCRD: CPT | Mod: CPTII,,, | Performed by: STUDENT IN AN ORGANIZED HEALTH CARE EDUCATION/TRAINING PROGRAM

## 2022-04-18 PROCEDURE — 99213 OFFICE O/P EST LOW 20 MIN: CPT | Mod: PBBFAC | Performed by: STUDENT IN AN ORGANIZED HEALTH CARE EDUCATION/TRAINING PROGRAM

## 2022-04-18 PROCEDURE — 99999 PR PBB SHADOW E&M-EST. PATIENT-LVL III: ICD-10-PCS | Mod: PBBFAC,,, | Performed by: STUDENT IN AN ORGANIZED HEALTH CARE EDUCATION/TRAINING PROGRAM

## 2022-04-18 PROCEDURE — 1159F PR MEDICATION LIST DOCUMENTED IN MEDICAL RECORD: ICD-10-PCS | Mod: CPTII,,, | Performed by: STUDENT IN AN ORGANIZED HEALTH CARE EDUCATION/TRAINING PROGRAM

## 2022-04-18 PROCEDURE — 1160F PR REVIEW ALL MEDS BY PRESCRIBER/CLIN PHARMACIST DOCUMENTED: ICD-10-PCS | Mod: CPTII,,, | Performed by: STUDENT IN AN ORGANIZED HEALTH CARE EDUCATION/TRAINING PROGRAM

## 2022-04-18 PROCEDURE — 99999 PR PBB SHADOW E&M-EST. PATIENT-LVL III: CPT | Mod: PBBFAC,,, | Performed by: STUDENT IN AN ORGANIZED HEALTH CARE EDUCATION/TRAINING PROGRAM

## 2022-04-18 NOTE — PROGRESS NOTES
Subjective:       Patient ID: Shital Duong is a 52 y.o. female.    Chief Complaint: Post-op Evaluation    HPI     DLS: 4/13/2022    Eye Med's: Pred QID OD                     Refresh 10 times a day OD                     Moxifloxacin TID OD                      Pataday BID OD                                             52 y.o. female is here for dryness and irritation, right eye. CTL has fell   out twice since last visit. Swelling around the lids, right eye. Yesterday   started experiencing headaches and eye pressure, right eye. Photophobia   due to dilated pupil, right eye. Denies eye pain on today, yesterday eye   pain was 9 out of 10.     Last edited by FREDERICK Arellano on 4/18/2022 11:54 AM. 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, severe stage    Post-operative state       Vicryl suture allergy   Noted previously with prior surgeries and required early suture removal  Removed running sutures at slit lamp   Placed Kontour lens  Vigamox TID while CTL in place  RTC 1 week for CTL removal    POW#2.5 GDI OD  IOP TOO HIGH  Cornea clear  Removed Kontour lens  Continue   PF QID    Vigamox QID    Atropine BID  Start timolol BID OD  No lifting over 10 pounds x 6 weeks  No bending, no straining  OK to shower, but no water in the eye  Discussed if any worsening vision, worsening pain, discharge or drainage, call immediately  Post op handout given and all questions answered        Glaucoma associated with ocular trauma OD - severe state  Aphakic OD  APD OD  Aphakic after trauma at 1 y/o  Presented to [facility] 2/16/22 with IOP to 70s  Had been out of drops  -Fhx (), Steroids (),Trauma(+)  -Drops:--> off glaucoma gtts   -Drop intolerance/contraindication:  -Laser:  -Surgeries: RD repair, cataract extraction OD // GDI OD  -CCT: 653 // 603  -Gonio:    Aphakia OD  Injury to right eye as a child with subsequent cataract  formation.   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      Hx RRD OD  s/p 25g PPV/EL/AFx/SF6 OD 3/21/18  Dr. Wilson     Functionally Monocular  Discussed monocular status with patient and increased risk of damage to well-seeing eye  Recommend protective eyewear at all times  Any MRx should be written with polycarbonate lenses    Amblyopia OD  Fairly dense given early onset of Va loss      PLAN  PF QID   Vigamox QID   Start timolol BID OD      RTC 2 weeks IOP check    Delma Chaves M.D., M.S.  Department of Ophthalmology   Division of Glaucoma Surgery  [facility]

## 2022-04-20 ENCOUNTER — PATIENT MESSAGE (OUTPATIENT)
Dept: OPHTHALMOLOGY | Facility: CLINIC | Age: 52
End: 2022-04-20

## 2022-04-20 ENCOUNTER — OFFICE VISIT (OUTPATIENT)
Dept: OPHTHALMOLOGY | Facility: CLINIC | Age: 52
End: 2022-04-20
Payer: MEDICAID

## 2022-04-20 DIAGNOSIS — H40.31X3 GLAUCOMA OF RIGHT EYE ASSOCIATED WITH OCULAR TRAUMA, SEVERE STAGE: Primary | ICD-10-CM

## 2022-04-20 DIAGNOSIS — Z86.69 HISTORY OF RETINAL DETACHMENT: ICD-10-CM

## 2022-04-20 DIAGNOSIS — H40.051 OCULAR HYPERTENSION, RIGHT: ICD-10-CM

## 2022-04-20 PROCEDURE — 99024 PR POST-OP FOLLOW-UP VISIT: ICD-10-PCS | Mod: ,,, | Performed by: STUDENT IN AN ORGANIZED HEALTH CARE EDUCATION/TRAINING PROGRAM

## 2022-04-20 PROCEDURE — 1159F PR MEDICATION LIST DOCUMENTED IN MEDICAL RECORD: ICD-10-PCS | Mod: CPTII,,, | Performed by: STUDENT IN AN ORGANIZED HEALTH CARE EDUCATION/TRAINING PROGRAM

## 2022-04-20 PROCEDURE — 99999 PR PBB SHADOW E&M-EST. PATIENT-LVL II: CPT | Mod: PBBFAC,,, | Performed by: STUDENT IN AN ORGANIZED HEALTH CARE EDUCATION/TRAINING PROGRAM

## 2022-04-20 PROCEDURE — 1160F RVW MEDS BY RX/DR IN RCRD: CPT | Mod: CPTII,,, | Performed by: STUDENT IN AN ORGANIZED HEALTH CARE EDUCATION/TRAINING PROGRAM

## 2022-04-20 PROCEDURE — 99999 PR PBB SHADOW E&M-EST. PATIENT-LVL II: ICD-10-PCS | Mod: PBBFAC,,, | Performed by: STUDENT IN AN ORGANIZED HEALTH CARE EDUCATION/TRAINING PROGRAM

## 2022-04-20 PROCEDURE — 99212 OFFICE O/P EST SF 10 MIN: CPT | Mod: PBBFAC | Performed by: STUDENT IN AN ORGANIZED HEALTH CARE EDUCATION/TRAINING PROGRAM

## 2022-04-20 PROCEDURE — 1159F MED LIST DOCD IN RCRD: CPT | Mod: CPTII,,, | Performed by: STUDENT IN AN ORGANIZED HEALTH CARE EDUCATION/TRAINING PROGRAM

## 2022-04-20 PROCEDURE — 99024 POSTOP FOLLOW-UP VISIT: CPT | Mod: ,,, | Performed by: STUDENT IN AN ORGANIZED HEALTH CARE EDUCATION/TRAINING PROGRAM

## 2022-04-20 PROCEDURE — 1160F PR REVIEW ALL MEDS BY PRESCRIBER/CLIN PHARMACIST DOCUMENTED: ICD-10-PCS | Mod: CPTII,,, | Performed by: STUDENT IN AN ORGANIZED HEALTH CARE EDUCATION/TRAINING PROGRAM

## 2022-04-20 NOTE — PROGRESS NOTES
Subjective:       Patient ID: Shital Duong is a 52 y.o. female.    Chief Complaint: No chief complaint on file.    HPI     Stitches still bothering her.     Last edited by Delma Chaves MD on 4/20/2022  8:29 AM. 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, severe stage    History of retinal detachment    Ocular hypertension, right        Vicryl suture allergy   Noted previously with prior surgeries and required early suture removal  Removed 4 vicryl sutures at limus at slit lamp  Vigamox QID x 7 days       POW#3 GDI OD  Cornea clear  Removed Kontour lens  Continue   PF QID    Vigamox QID    Atropine BID   Timolol BID OD  No lifting over 10 pounds x 6 weeks  No bending, no straining  OK to shower, but no water in the eye  Discussed if any worsening vision, worsening pain, discharge or drainage, call immediately  Post op handout given and all questions answered        Glaucoma associated with ocular trauma OD - severe state  Aphakic OD  APD OD  Aphakic after trauma at 3 y/o  Presented to [facility] 2/16/22 with IOP to 70s  Had been out of drops  -Fhx (), Steroids (),Trauma(+)  -Drops:--> off glaucoma gtts   -Drop intolerance/contraindication:  -Laser:  -Surgeries: RD repair, cataract extraction OD // GDI OD  -CCT: 653 // 603  -Gonio:    Aphakia OD  Injury to right eye as a child with subsequent cataract formation.   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      Hx RRD OD  s/p 25g PPV/EL/AFx/SF6 OD 3/21/18  Dr. Wilson     Functionally Monocular  Discussed monocular status with patient and increased risk of damage to well-seeing eye  Recommend protective eyewear at all times  Any MRx should be written with polycarbonate lenses    Amblyopia OD  Fairly dense given early onset of Va loss            RTC 1 week IOP check    Delma Chaves M.D., M.S.  Department of  Ophthalmology   Division of Glaucoma Surgery  [facility]

## 2022-05-03 NOTE — PROGRESS NOTES
Subjective:       Patient ID: Shital Duong is a 52 y.o. female.    Chief Complaint: Post-op Evaluation     HPI     Patient her today for 1 mo post op AHMED glaucoma shunt OD. Patient   states eyes are improving. No pain. No f/f.    Timolol OD prn    Last edited by Tami Cadena on 5/4/2022  2:09 PM. 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  Dispense: 10 mL; Refill: 3    Post-operative state  -     prednisoLONE acetate (PRED FORTE) 1 % DrpS; Place 1 drop into the left eye 3 (three) times daily.  Dispense: 10 mL; Refill: 3    Aphakia, right    History of retinal detachment    Amblyopia, right    History of glaucoma tube shunt procedure       POW#5 GDI AVG + PPG OD (3/31/22)  Cornea clear  Hypertensive phase  Continue   PF QID --> taper to TID -- not doing - ran out 3 days ago  Stop Timolol BID OD  Start dorzolamide-timolol BID OD  No lifting over 10 pounds x 6 weeks  No bending, no straining  OK to shower, but no water in the eye  Discussed if any worsening vision, worsening pain, discharge or drainage, call immediately  Post op handout given and all questions answered  Going to [place] 6/3/22    Vicryl suture allergy   Noted previously with prior surgeries and required early suture removal  Removed 4 vicryl sutures at limus at slit lamp  Vigamox QID x 7 days     Glaucoma associated with ocular trauma OD - severe state  Aphakic OD  APD OD  Aphakic after trauma at 1 y/o  Presented to [facility] 2/16/22 with IOP to 70s  Had been out of drops  -Fhx (), Steroids (),Trauma(+)  -Drops:--> off glaucoma gtts -- restarting dorzolamide-timolol BID OD  -Drop intolerance/contraindication:  -Laser:  -Surgeries: RD repair, cataract extraction OD // GDI OD  -CCT: 653 // 603  -Gonio:    Aphakia OD  Injury to right eye as a child with  subsequent cataract formation.   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      Hx RRD OD  s/p 25g PPV/EL/AFx/SF6 OD 3/21/18  Dr. Wilson     Functionally Monocular  Discussed monocular status with patient and increased risk of damage to well-seeing eye  Recommend protective eyewear at all times  Any MRx should be written with polycarbonate lenses    Amblyopia OD  Fairly dense given early onset of Va loss      PLAN  PF TID OD  Dorzolamide-timolol BID OD      RTC 3 weeks IOP check      Delma Chaves M.D., M.S.  Department of Ophthalmology   Division of Glaucoma Surgery  [facility]

## 2022-05-04 ENCOUNTER — OFFICE VISIT (OUTPATIENT)
Dept: OPHTHALMOLOGY | Facility: CLINIC | Age: 52
End: 2022-05-04
Payer: MEDICAID

## 2022-05-04 DIAGNOSIS — H53.001 AMBLYOPIA, RIGHT: ICD-10-CM

## 2022-05-04 DIAGNOSIS — Z96.89 HISTORY OF GLAUCOMA TUBE SHUNT PROCEDURE: ICD-10-CM

## 2022-05-04 DIAGNOSIS — H27.01 APHAKIA, RIGHT: ICD-10-CM

## 2022-05-04 DIAGNOSIS — H40.31X3 GLAUCOMA OF RIGHT EYE ASSOCIATED WITH OCULAR TRAUMA, SEVERE STAGE: Primary | ICD-10-CM

## 2022-05-04 DIAGNOSIS — Z86.69 HISTORY OF RETINAL DETACHMENT: ICD-10-CM

## 2022-05-04 DIAGNOSIS — Z98.890 POST-OPERATIVE STATE: ICD-10-CM

## 2022-05-04 PROCEDURE — 99213 OFFICE O/P EST LOW 20 MIN: CPT | Mod: PBBFAC | Performed by: STUDENT IN AN ORGANIZED HEALTH CARE EDUCATION/TRAINING PROGRAM

## 2022-05-04 PROCEDURE — 99024 POSTOP FOLLOW-UP VISIT: CPT | Mod: ,,, | Performed by: STUDENT IN AN ORGANIZED HEALTH CARE EDUCATION/TRAINING PROGRAM

## 2022-05-04 PROCEDURE — 1159F PR MEDICATION LIST DOCUMENTED IN MEDICAL RECORD: ICD-10-PCS | Mod: CPTII,,, | Performed by: STUDENT IN AN ORGANIZED HEALTH CARE EDUCATION/TRAINING PROGRAM

## 2022-05-04 PROCEDURE — 99024 PR POST-OP FOLLOW-UP VISIT: ICD-10-PCS | Mod: ,,, | Performed by: STUDENT IN AN ORGANIZED HEALTH CARE EDUCATION/TRAINING PROGRAM

## 2022-05-04 PROCEDURE — 1160F PR REVIEW ALL MEDS BY PRESCRIBER/CLIN PHARMACIST DOCUMENTED: ICD-10-PCS | Mod: CPTII,,, | Performed by: STUDENT IN AN ORGANIZED HEALTH CARE EDUCATION/TRAINING PROGRAM

## 2022-05-04 PROCEDURE — 1159F MED LIST DOCD IN RCRD: CPT | Mod: CPTII,,, | Performed by: STUDENT IN AN ORGANIZED HEALTH CARE EDUCATION/TRAINING PROGRAM

## 2022-05-04 PROCEDURE — 99999 PR PBB SHADOW E&M-EST. PATIENT-LVL III: CPT | Mod: PBBFAC,,, | Performed by: STUDENT IN AN ORGANIZED HEALTH CARE EDUCATION/TRAINING PROGRAM

## 2022-05-04 PROCEDURE — 1160F RVW MEDS BY RX/DR IN RCRD: CPT | Mod: CPTII,,, | Performed by: STUDENT IN AN ORGANIZED HEALTH CARE EDUCATION/TRAINING PROGRAM

## 2022-05-04 PROCEDURE — 99999 PR PBB SHADOW E&M-EST. PATIENT-LVL III: ICD-10-PCS | Mod: PBBFAC,,, | Performed by: STUDENT IN AN ORGANIZED HEALTH CARE EDUCATION/TRAINING PROGRAM

## 2022-05-04 RX ORDER — PREDNISOLONE ACETATE 10 MG/ML
1 SUSPENSION/ DROPS OPHTHALMIC 3 TIMES DAILY
Qty: 10 ML | Refills: 3 | Status: SHIPPED | OUTPATIENT
Start: 2022-05-04 | End: 2022-06-03

## 2022-05-04 RX ORDER — DORZOLAMIDE HYDROCHLORIDE AND TIMOLOL MALEATE 20; 5 MG/ML; MG/ML
1 SOLUTION/ DROPS OPHTHALMIC 2 TIMES DAILY
Qty: 10 ML | Refills: 3 | Status: SHIPPED | OUTPATIENT
Start: 2022-05-04 | End: 2023-05-04

## 2022-05-19 ENCOUNTER — PATIENT MESSAGE (OUTPATIENT)
Dept: OPHTHALMOLOGY | Facility: CLINIC | Age: 52
End: 2022-05-19
Payer: MEDICAID

## 2022-05-25 ENCOUNTER — OFFICE VISIT (OUTPATIENT)
Dept: OPHTHALMOLOGY | Facility: CLINIC | Age: 52
End: 2022-05-25
Payer: MEDICAID

## 2022-05-25 ENCOUNTER — PATIENT MESSAGE (OUTPATIENT)
Dept: OPHTHALMOLOGY | Facility: CLINIC | Age: 52
End: 2022-05-25

## 2022-05-25 DIAGNOSIS — Z96.89 HISTORY OF GLAUCOMA TUBE SHUNT PROCEDURE: ICD-10-CM

## 2022-05-25 DIAGNOSIS — Z86.69 HISTORY OF RETINAL DETACHMENT: ICD-10-CM

## 2022-05-25 DIAGNOSIS — H40.31X3 GLAUCOMA OF RIGHT EYE ASSOCIATED WITH OCULAR TRAUMA, SEVERE STAGE: Primary | ICD-10-CM

## 2022-05-25 DIAGNOSIS — Z98.890 POST-OPERATIVE STATE: ICD-10-CM

## 2022-05-25 DIAGNOSIS — H53.001 AMBLYOPIA, RIGHT: ICD-10-CM

## 2022-05-25 DIAGNOSIS — H27.01 APHAKIA, RIGHT: ICD-10-CM

## 2022-05-25 PROCEDURE — 99024 PR POST-OP FOLLOW-UP VISIT: ICD-10-PCS | Mod: ,,, | Performed by: STUDENT IN AN ORGANIZED HEALTH CARE EDUCATION/TRAINING PROGRAM

## 2022-05-25 PROCEDURE — 99999 PR PBB SHADOW E&M-EST. PATIENT-LVL II: CPT | Mod: PBBFAC,,, | Performed by: STUDENT IN AN ORGANIZED HEALTH CARE EDUCATION/TRAINING PROGRAM

## 2022-05-25 PROCEDURE — 1160F PR REVIEW ALL MEDS BY PRESCRIBER/CLIN PHARMACIST DOCUMENTED: ICD-10-PCS | Mod: CPTII,,, | Performed by: STUDENT IN AN ORGANIZED HEALTH CARE EDUCATION/TRAINING PROGRAM

## 2022-05-25 PROCEDURE — 99024 POSTOP FOLLOW-UP VISIT: CPT | Mod: ,,, | Performed by: STUDENT IN AN ORGANIZED HEALTH CARE EDUCATION/TRAINING PROGRAM

## 2022-05-25 PROCEDURE — 1159F PR MEDICATION LIST DOCUMENTED IN MEDICAL RECORD: ICD-10-PCS | Mod: CPTII,,, | Performed by: STUDENT IN AN ORGANIZED HEALTH CARE EDUCATION/TRAINING PROGRAM

## 2022-05-25 PROCEDURE — 99999 PR PBB SHADOW E&M-EST. PATIENT-LVL II: ICD-10-PCS | Mod: PBBFAC,,, | Performed by: STUDENT IN AN ORGANIZED HEALTH CARE EDUCATION/TRAINING PROGRAM

## 2022-05-25 PROCEDURE — 1159F MED LIST DOCD IN RCRD: CPT | Mod: CPTII,,, | Performed by: STUDENT IN AN ORGANIZED HEALTH CARE EDUCATION/TRAINING PROGRAM

## 2022-05-25 PROCEDURE — 1160F RVW MEDS BY RX/DR IN RCRD: CPT | Mod: CPTII,,, | Performed by: STUDENT IN AN ORGANIZED HEALTH CARE EDUCATION/TRAINING PROGRAM

## 2022-05-25 PROCEDURE — 99212 OFFICE O/P EST SF 10 MIN: CPT | Mod: PBBFAC | Performed by: STUDENT IN AN ORGANIZED HEALTH CARE EDUCATION/TRAINING PROGRAM

## 2022-05-25 NOTE — PROGRESS NOTES
Subjective:       Patient ID: Shital Duong is a 52 y.o. female.    Chief Complaint: No chief complaint on file.       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 Pt denies changes since last visit [facility]. Pt   denies eye pain [facility].       Last edited by Renetta Beyer MA on 5/25/2022  2:02 PM.   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, severe stage    Post-operative state    Aphakia, right    History of retinal detachment    Amblyopia, right    History of glaucoma tube shunt procedure       POW#8 GDI AVG + PPG OD (3/31/22)  Cornea clear  Hypertensive phase  Continue   PF BID x 2 weeks, then daily x 2 weeks then off  Continue dorzolamide-timolol BID OD  Return to normal activities   Going to [place] 6/3/22    Glaucoma associated with ocular trauma OD - severe state  Aphakic OD  APD OD  Aphakic after trauma at 1 y/o  Presented to [facility] 2/16/22 with IOP to 70s  Had been out of drops  -Fhx (), Steroids (),Trauma(+)  -Drops:--> off glaucoma gtts -- restarting dorzolamide-timolol BID OD  -Drop intolerance/contraindication:  -Laser:  -Surgeries: RD repair, cataract extraction OD // GDI OD  -CCT: 653 // 603  -Gonio:    Aphakia OD  Injury to right eye as a child with subsequent cataract formation.   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      Hx RRD OD  s/p 25g PPV/EL/AFx/SF6 OD 3/21/18  Dr. Wilson     Functionally Monocular  Discussed monocular status with  patient and increased risk of damage to well-seeing eye  Recommend protective eyewear at all times  Any MRx should be written with polycarbonate lenses    Amblyopia OD  Fairly dense given early onset of Va loss    Vicryl suture allergy   Required early suture removal        PLAN  Taper PF 2/1/0 OD  Dorzolamide-timolol BID OD      RTC 2 monthsIOP check    Delma Chaves M.D., M.S.  Department of Ophthalmology   Division of Glaucoma Surgery  [facility]

## 2022-09-18 ENCOUNTER — HOSPITAL ENCOUNTER (EMERGENCY)
Facility: HOSPITAL | Age: 52
Discharge: HOME | End: 2022-09-18
Attending: STUDENT IN AN ORGANIZED HEALTH CARE EDUCATION/TRAINING PROGRAM
Payer: MEDICAID

## 2022-09-18 VITALS
DIASTOLIC BLOOD PRESSURE: 84 MMHG | OXYGEN SATURATION: 96 % | SYSTOLIC BLOOD PRESSURE: 135 MMHG | BODY MASS INDEX: 27.23 KG/M2 | HEART RATE: 67 BPM | WEIGHT: 148 LBS | HEIGHT: 62 IN | TEMPERATURE: 98 F | RESPIRATION RATE: 18 BRPM

## 2022-09-18 DIAGNOSIS — K57.92 DIVERTICULITIS: Primary | ICD-10-CM

## 2022-09-18 LAB
ALBUMIN SERPL BCP-MCNC: 4 G/DL (ref 3.5–5.2)
ALP SERPL-CCNC: 78 U/L (ref 55–135)
ALT SERPL W/O P-5'-P-CCNC: 11 U/L (ref 10–44)
ANION GAP SERPL CALC-SCNC: 12 MMOL/L (ref 8–16)
ANION GAP SERPL CALC-SCNC: 16 MMOL/L (ref 8–16)
AST SERPL-CCNC: 15 U/L (ref 10–40)
BASOPHILS # BLD AUTO: 0.05 K/UL (ref 0–0.2)
BASOPHILS NFR BLD: 0.5 % (ref 0–1.9)
BILIRUB SERPL-MCNC: 1.3 MG/DL (ref 0.1–1)
BILIRUB UR QL STRIP: NEGATIVE
BUN SERPL-MCNC: 12 MG/DL (ref 6–20)
BUN SERPL-MCNC: 12 MG/DL (ref 6–30)
CALCIUM SERPL-MCNC: 10.4 MG/DL (ref 8.7–10.5)
CHLORIDE SERPL-SCNC: 100 MMOL/L (ref 95–110)
CHLORIDE SERPL-SCNC: 103 MMOL/L (ref 95–110)
CLARITY UR: CLEAR
CO2 SERPL-SCNC: 24 MMOL/L (ref 23–29)
COLOR UR: COLORLESS
CREAT SERPL-MCNC: 0.8 MG/DL (ref 0.5–1.4)
CREAT SERPL-MCNC: 0.8 MG/DL (ref 0.5–1.4)
DIFFERENTIAL METHOD: NORMAL
EOSINOPHIL # BLD AUTO: 0.2 K/UL (ref 0–0.5)
EOSINOPHIL NFR BLD: 1.6 % (ref 0–8)
ERYTHROCYTE [DISTWIDTH] IN BLOOD BY AUTOMATED COUNT: 12.5 % (ref 11.5–14.5)
EST. GFR  (NO RACE VARIABLE): >60 ML/MIN/1.73 M^2
GLUCOSE SERPL-MCNC: 107 MG/DL (ref 70–110)
GLUCOSE SERPL-MCNC: 107 MG/DL (ref 70–110)
GLUCOSE UR QL STRIP: NEGATIVE
HCT VFR BLD AUTO: 37.7 % (ref 37–48.5)
HCT VFR BLD CALC: 39 %PCV (ref 36–54)
HGB BLD-MCNC: 12.4 G/DL (ref 12–16)
HGB UR QL STRIP: NEGATIVE
IMM GRANULOCYTES # BLD AUTO: 0.02 K/UL (ref 0–0.04)
IMM GRANULOCYTES NFR BLD AUTO: 0.2 % (ref 0–0.5)
KETONES UR QL STRIP: NEGATIVE
LEUKOCYTE ESTERASE UR QL STRIP: ABNORMAL
LIPASE SERPL-CCNC: 20 U/L (ref 4–60)
LYMPHOCYTES # BLD AUTO: 1.9 K/UL (ref 1–4.8)
LYMPHOCYTES NFR BLD: 19.8 % (ref 18–48)
MCH RBC QN AUTO: 28.7 PG (ref 27–31)
MCHC RBC AUTO-ENTMCNC: 32.9 G/DL (ref 32–36)
MCV RBC AUTO: 87 FL (ref 82–98)
MICROSCOPIC COMMENT: NORMAL
MONOCYTES # BLD AUTO: 0.7 K/UL (ref 0.3–1)
MONOCYTES NFR BLD: 7.5 % (ref 4–15)
NEUTROPHILS # BLD AUTO: 6.6 K/UL (ref 1.8–7.7)
NEUTROPHILS NFR BLD: 70.4 % (ref 38–73)
NITRITE UR QL STRIP: NEGATIVE
NRBC BLD-RTO: 0 /100 WBC
PH UR STRIP: 6 [PH] (ref 5–8)
PLATELET # BLD AUTO: 235 K/UL (ref 150–450)
PMV BLD AUTO: 10.9 FL (ref 9.2–12.9)
POC IONIZED CALCIUM: 1.28 MMOL/L (ref 1.06–1.42)
POC TCO2 (MEASURED): 28 MMOL/L (ref 23–29)
POTASSIUM BLD-SCNC: 3.9 MMOL/L (ref 3.5–5.1)
POTASSIUM SERPL-SCNC: 4.1 MMOL/L (ref 3.5–5.1)
PROT SERPL-MCNC: 7.7 G/DL (ref 6–8.4)
PROT UR QL STRIP: NEGATIVE
RBC # BLD AUTO: 4.32 M/UL (ref 4–5.4)
SAMPLE: NORMAL
SODIUM BLD-SCNC: 139 MMOL/L (ref 136–145)
SODIUM SERPL-SCNC: 139 MMOL/L (ref 136–145)
SP GR UR STRIP: 1 (ref 1–1.03)
URN SPEC COLLECT METH UR: ABNORMAL
UROBILINOGEN UR STRIP-ACNC: NEGATIVE EU/DL
WBC # BLD AUTO: 9.39 K/UL (ref 3.9–12.7)
WBC #/AREA URNS HPF: 4 /HPF (ref 0–5)

## 2022-09-18 PROCEDURE — 99900035 HC TECH TIME PER 15 MIN (STAT)

## 2022-09-18 PROCEDURE — 25500020 PHARM REV CODE 255: Performed by: STUDENT IN AN ORGANIZED HEALTH CARE EDUCATION/TRAINING PROGRAM

## 2022-09-18 PROCEDURE — 82330 ASSAY OF CALCIUM: CPT

## 2022-09-18 PROCEDURE — 96374 THER/PROPH/DIAG INJ IV PUSH: CPT | Mod: 59

## 2022-09-18 PROCEDURE — 82565 ASSAY OF CREATININE: CPT

## 2022-09-18 PROCEDURE — 84132 ASSAY OF SERUM POTASSIUM: CPT

## 2022-09-18 PROCEDURE — 85014 HEMATOCRIT: CPT | Mod: 59

## 2022-09-18 PROCEDURE — 85025 COMPLETE CBC W/AUTO DIFF WBC: CPT | Performed by: STUDENT IN AN ORGANIZED HEALTH CARE EDUCATION/TRAINING PROGRAM

## 2022-09-18 PROCEDURE — 63600175 PHARM REV CODE 636 W HCPCS: Performed by: STUDENT IN AN ORGANIZED HEALTH CARE EDUCATION/TRAINING PROGRAM

## 2022-09-18 PROCEDURE — 96376 TX/PRO/DX INJ SAME DRUG ADON: CPT

## 2022-09-18 PROCEDURE — 83690 ASSAY OF LIPASE: CPT | Performed by: STUDENT IN AN ORGANIZED HEALTH CARE EDUCATION/TRAINING PROGRAM

## 2022-09-18 PROCEDURE — 96375 TX/PRO/DX INJ NEW DRUG ADDON: CPT

## 2022-09-18 PROCEDURE — 81000 URINALYSIS NONAUTO W/SCOPE: CPT | Performed by: STUDENT IN AN ORGANIZED HEALTH CARE EDUCATION/TRAINING PROGRAM

## 2022-09-18 PROCEDURE — 84295 ASSAY OF SERUM SODIUM: CPT

## 2022-09-18 PROCEDURE — 99285 EMERGENCY DEPT VISIT HI MDM: CPT | Mod: 25

## 2022-09-18 PROCEDURE — 80053 COMPREHEN METABOLIC PANEL: CPT | Performed by: STUDENT IN AN ORGANIZED HEALTH CARE EDUCATION/TRAINING PROGRAM

## 2022-09-18 RX ORDER — ONDANSETRON 2 MG/ML
4 INJECTION INTRAMUSCULAR; INTRAVENOUS
Status: COMPLETED | OUTPATIENT
Start: 2022-09-18 | End: 2022-09-18

## 2022-09-18 RX ORDER — CIPROFLOXACIN 500 MG/1
500 TABLET ORAL 2 TIMES DAILY
Qty: 14 TABLET | Refills: 0 | Status: SHIPPED | OUTPATIENT
Start: 2022-09-18 | End: 2022-09-25

## 2022-09-18 RX ORDER — METRONIDAZOLE 500 MG/1
500 TABLET ORAL 3 TIMES DAILY
Qty: 21 TABLET | Refills: 0 | Status: SHIPPED | OUTPATIENT
Start: 2022-09-18 | End: 2022-09-25

## 2022-09-18 RX ORDER — MORPHINE SULFATE 4 MG/ML
4 INJECTION, SOLUTION INTRAMUSCULAR; INTRAVENOUS
Status: COMPLETED | OUTPATIENT
Start: 2022-09-18 | End: 2022-09-18

## 2022-09-18 RX ORDER — OXYCODONE AND ACETAMINOPHEN 5; 325 MG/1; MG/1
1 TABLET ORAL EVERY 6 HOURS PRN
Qty: 12 TABLET | Refills: 0 | Status: SHIPPED | OUTPATIENT
Start: 2022-09-18 | End: 2022-09-21

## 2022-09-18 RX ADMIN — IOHEXOL 75 ML: 350 INJECTION, SOLUTION INTRAVENOUS at 08:09

## 2022-09-18 RX ADMIN — MORPHINE SULFATE 4 MG: 4 INJECTION INTRAVENOUS at 10:09

## 2022-09-18 RX ADMIN — ONDANSETRON 4 MG: 2 INJECTION INTRAMUSCULAR; INTRAVENOUS at 07:09

## 2022-09-18 RX ADMIN — MORPHINE SULFATE 4 MG: 4 INJECTION INTRAVENOUS at 07:09

## 2022-09-18 NOTE — DISCHARGE INSTRUCTIONS
Thank you for coming to our Emergency Department today. It is important to remember that some problems or medical conditions are difficult to diagnose and may not be found during your Emergency Department visit.     Be sure to follow up with your primary care doctor and review all labs/imaging/tests that were performed during your ER visit with them. Some labs/tests may be outside of the normal range and require non-emergent follow-up and further investigation to help diagnose/exclude/prevent complications or other potentially serious medical conditions that were not addressed during your ER visit.    If you do not have a primary care doctor, you may contact the one listed on your discharge paperwork or you may also call the [facility] Appointment Desk at [phone] to schedule an appointment and establish care with one. It is important to your health that you have a primary care doctor.    Please take all medications as directed. All medications may potentially have side-effects and it is impossible to predict which medications may give you side-effects or what side-effects (if any) they will give you.. If you feel that you are having a negative effect or side-effect of any medication you should immediately stop taking them and seek medical attention. If you feel that you are having a life-threatening reaction call 911.    Return to the ER with any questions/concerns, new/concerning symptoms, worsening or failure to improve.     Do not drive, swim, climb to height, take a bath, operate heavy machinery, drink alcohol or take potentially sedating medications, sign any legal documents or make any important decisions for 24 hours if you have received any pain medications, sedatives or mood altering drugs during your ER visit or within 24 hours of taking them if they have been prescribed to you.     You can find additional resources for Dentists, hearing aids, durable medical equipment, low cost pharmacies and  other resources at [email or web address].org    BELOW THIS LINE ONLY APPLIES IF YOU HAVE A COVID TEST PENDING OR IF YOU HAVE BEEN DIAGNOSED WITH COVID:  Please access [facility] to review the results of your test. Until the results of your COVID test return, you should isolate yourself so as not to potentially spread illness to others.   If your COVID test returns positive, you should isolate yourself so as not to spread illness to others. After five full days, if you are feeling better and you have not had fever for 24 hours, you can return to your typical daily activities, but you must wear a mask around others for an additional 5 days.   If your COVID test returns negative and you are either unvaccinated or more than six months out from your two-dose vaccine and are not yet boosted, you should still quarantine for 5 full days followed by strict mask use for an additional 5 full days.   If your COVID test returns negative and you have received your 2-dose initial vaccine as well as a booster, you should continue strict mask use for 10 full days after the exposure.  For all those exposed, best practice includes a test at day 5 after the exposure. This can be a home test or a test through one of the many testing centers throughout our community.   Masking is always advised to limit the spread of COVID. Cdc.gov is an excellent site to obtain the latest up to date recommendations regarding COVID and COVID testing.     CDC Testing and Quarantine Guidelines for patients with exposure to a known-positive COVID-19 person:  A close exposure is defined as anyone who has had an exposure (masked or unmasked) to a known COVID -19 positive person within 6 feet of someone for a cumulative total of 15 minutes or more over a 24-hour period.   Vaccinated and/or if you recently had a positive covid test within 90 days do NOT need to quarantine after contact with someone who had COVID-19 unless you develop symptoms.   Fully vaccinated  people who have not had a positive test within 90 days, should get tested 3-5 days after their exposure, even if they don't have symptoms and wear a mask indoors in public for 14 days following exposure or until their test result is negative.      Unvaccinated and/or NOT had a positive test within 90 days and meet close exposure  You are required by CDC guidelines to quarantine for at least 5 days from time of exposure followed by 5 days of strict masking. It is recommended, but not required to test after 5 days, unless you develop symptoms, in which case you should test at that time.  If you get tested after 5 days and your test is positive, your 5 day period of isolation starts the day of the positive test.    If your exposure does not meet the above definition, you can return to your normal daily activities to include social distancing, wearing a mask and frequent handwashing.      Here is a link to guidance from the CDC:  https://www.cdc.gov/media/releases/2021/s1227-isolation-quarantine-guidance.html      [place] Dept Of Health Testing Sites:  https://ldh.la.gov/page/3934      [facility] website with testing locations and guidance:  [email or web address]

## 2022-09-18 NOTE — ED PROVIDER NOTES
"Encounter Date: 9/18/2022    SCRIBE #1 NOTE: I, SINAJANIS ARELLANO, am scribing for, and in the presence of,  Annie Albert DO. I have scribed the following portions of the note - Other sections scribed: HPI, ROS, PE, MDM.     History     Chief Complaint   Patient presents with    Abdominal Pain     LLQ "stabbing" since last night. Worse with movement. Denies urinary s/s. Took tylenol without relief     52-year-old female with a past medical history of Amblyopia, Cataract, Diverticulitis status post Sigmoidectomy, Nephrolithiasis, Retinal detachment, and Unspecified glaucoma, and a past surgical history of Cataract extraction, Cholecystectomy, Gastric sleeve, Implantation of device for glaucoma, Retinal detachment surgery, and , presents to the ED with a chief complaint of left-sdided abdominal pain onset ten hours ago PTA. The patient states that at work last night and started feeling some abdominal pressure around thirteen hours ago, prompting her to take 1000 mg acetaminophen. She states that it started to worsen to a "stabbing, nine out of ten" pain around ten hours ago, prompting her to take another 1000 mg acetaminophen in attempt to alleviate pain with no resolution. She reports that she possibly had the same pain from diverticulitis years ago, but she is unsure. Patient states that the pain exacerbates with ambulation and movement. She reports additional symptoms of light-headedness and nausea due to pain. Denies any stool abnormalities from baseline (watery stool due to cholecystectomy). Further denies any recent colonoscopy, drug or tobacco usage, but endorses EtOH usage socially. Patient notes that her pupil is dilated in right eye at baseline due to glaucoma. Further notes that her uterus is still present but no longer has menses. No other exacerbating or alleviating factors. Denies CP, SOB, fever, chills, weakness, syncope, myalgias, headache, or other associated symptoms.     The history is " provided by the patient. No  was used.   Review of patient's allergies indicates:   Allergen Reactions    Aspirin     Hydrocodone Other (See Comments)    Hydromorphone     Vicryl [sutures, polyglycolic acid] Itching     Has needed early suture removal due to itching, edema, and granuloma formation with vicryl sutures.    Azithromycin Nausea And Vomiting     Past Medical History:   Diagnosis Date    Amblyopia     Cataract     Retinal detachment     Unspecified glaucoma      Past Surgical History:   Procedure Laterality Date    BREAST CYST EXCISION      BREAST SURGERY      CATARACT EXTRACTION      GASTRIC BYPASS      Gastric sleeve      IMPLANTATION OF DEVICE FOR GLAUCOMA Right 03/31/2022    Procedure: INSERTION, DEVICE, FOR GLAUCOMA/  RIGHT EYE;  Surgeon: Delma Chaves MD;  Location: [facility] OR [address] [place] [address];  Service: Ophthalmology;  Laterality: Right;    INSERTION OF BREAST IMPLANT      REDUCTION OF BOTH BREASTS      RETINAL DETACHMENT SURGERY       Family History   Problem Relation Age of Onset    Breast cancer Neg Hx     Colon cancer Neg Hx     Ovarian cancer Neg Hx     Amblyopia Neg Hx     Blindness Neg Hx     Cataracts Neg Hx     Glaucoma Neg Hx     Macular degeneration Neg Hx     Retinal detachment Neg Hx     Strabismus Neg Hx      Social History     Tobacco Use    Smoking status: Never    Smokeless tobacco: Never   Substance Use Topics    Alcohol use: Yes     Alcohol/week: 3.0 standard drinks     Types: 3 Shots of liquor per week    Drug use: Never     Review of Systems   Constitutional:  Negative for chills and fever.   HENT:  Negative for drooling, facial swelling and trouble swallowing.    Eyes:  Negative for redness and visual disturbance.   Respiratory:  Negative for cough, shortness of breath and stridor.    Cardiovascular:  Negative for chest pain.   Gastrointestinal:  Positive for abdominal pain and nausea. Negative for anal bleeding, blood in stool, constipation, diarrhea and  vomiting.   Genitourinary:  Negative for dysuria and hematuria.   Musculoskeletal:  Negative for gait problem, myalgias and neck stiffness.   Skin:  Negative for pallor and wound.   Neurological:  Positive for light-headedness. Negative for syncope, facial asymmetry, speech difficulty, weakness and headaches.   Psychiatric/Behavioral:  Negative for confusion.    All other systems reviewed and are negative.    Physical Exam     Initial Vitals [09/18/22 0659]   BP Pulse Resp Temp SpO2   (!) 175/84 77 16 98.2 °F (36.8 °C) 100 %      MAP       --         Physical Exam    Nursing note and vitals reviewed.  Constitutional: She appears well-developed and well-nourished. She is not diaphoretic.  Non-toxic appearance. She does not have a sickly appearance. She does not appear ill.   HENT:   Head: Normocephalic and atraumatic.   Right Ear: External ear normal.   Left Ear: External ear normal.   Nose: Nose normal.   Mouth/Throat: Oropharynx is clear and moist.   Eyes: Conjunctivae are normal. No scleral icterus.   Fixed and dilated right pupil (baseline).    Cardiovascular:  Normal rate, regular rhythm, normal heart sounds and intact distal pulses.     Exam reveals no gallop and no friction rub.       No murmur heard.  Pulmonary/Chest: Breath sounds normal. No respiratory distress. She has no wheezes. She has no rhonchi. She has no rales.   Abdominal: Abdomen is soft. She exhibits no distension. There is abdominal tenderness in the left upper quadrant and left lower quadrant.   There is left CVA tenderness. There is no rebound, no guarding, no tenderness at McBurney's point and negative Farnsworth's sign.   Musculoskeletal:         General: No tenderness or edema. Normal range of motion.     Lymphadenopathy:     She has no cervical adenopathy.   Neurological: She is alert. She has normal strength. She displays no atrophy and no tremor. She exhibits normal muscle tone. She displays no seizure activity.   Moves all extremities,  follows all commands, no focal neurological deficits   Skin: Skin is warm and dry. No rash noted. No erythema.   Psychiatric: She has a normal mood and affect.       ED Course   Procedures  Labs Reviewed   COMPREHENSIVE METABOLIC PANEL - Abnormal; Notable for the following components:       Result Value    Total Bilirubin 1.3 (*)     All other components within normal limits   URINALYSIS, REFLEX TO URINE CULTURE - Abnormal; Notable for the following components:    Color, UA Colorless (*)     Leukocytes, UA Trace (*)     All other components within normal limits    Narrative:     Specimen Source->Urine   CBC W/ AUTO DIFFERENTIAL   LIPASE   URINALYSIS MICROSCOPIC    Narrative:     Specimen Source->Urine   ISTAT PROCEDURE   ISTAT CHEM8          Imaging Results              CT Abdomen Pelvis With Contrast (Final result)  Result time 09/18/22 09:38:21      Final result by Drake Cummings MD (09/18/22 09:38:21)                   Impression:      Focal area of inflammation in the pericolonic region and thickening of the wall in the mid descending colon just proximal to an anastomosis with GI staples.  Findings are most compatible with acute diverticulitis.  No evidence of perforation or abscess.    More proximal colon diverticulosis without proximal diverticulitis.    Cholecystectomy, gastric sleeve      Electronically signed by: Drake Cummings  Date:    09/18/2022  Time:    09:38               Narrative:    EXAMINATION:  CT ABDOMEN PELVIS WITH CONTRAST    CLINICAL HISTORY:  Abdominal abscess/infection suspected;    TECHNIQUE:  Low dose axial images, sagittal and coronal reformations were obtained from the lung bases to the pubic symphysis following the IV administration of 75 mL of Omnipaque 350 .  Oral contrast was not given.    COMPARISON:  None.    FINDINGS:  There is thickening of the mid descending colon just proximal to a GI staple line with a few diverticula noted.  Thickening of the lateral conal fascia is  present.  There is no evidence of free air to suggest perforation or abscess.  There is no obstruction.  Distal to the GI staples the sigmoid colon appears unremarkable.  More proximal transverse and splenic flexure colon diverticula are noted without inflammation.    The urinary bladder appears unremarkable.  The uterus appears normal in size and density.  No pelvic adenopathy is evident.  The appendix appears normal in the right lower quadrant.    There is been cholecystectomy with mild dilatation of the extrahepatic biliary ductal system.  The pancreas, spleen, adrenal glands and kidneys appear normal.  Gastric surgery of gastric sleeve type appears present.  The abdominal aorta and vena cava appear normal.  There is no abscess free fluid or lymph node enlargement within the abdomen or pelvis.  Mild spondylosis is noted.  The abdominal wall appears intact.  Saline breast implants are noted.  The lung bases are clear                                       Medications   morphine injection 4 mg (4 mg Intravenous Given 9/18/22 0755)   ondansetron injection 4 mg (4 mg Intravenous Given 9/18/22 0755)   iohexoL (OMNIPAQUE 350) injection 75 mL (75 mLs Intravenous Given 9/18/22 0858)   morphine injection 4 mg (4 mg Intravenous Given 9/18/22 1019)     Medical Decision Making:   History:   Old Medical Records: I decided to obtain old medical records.  Clinical Tests:   Lab Tests: Ordered and Reviewed  Radiological Study: Ordered and Reviewed  ED Management:   [facility]  This is an emergent evaluation of a 52 y.o. female with a past medical history of Diverticulitis s/p Sigmoidectomy, presents to the ED with a chief complaint of left-sdided abdominal pain onset ten hours ago PTA. Initial vitals in the ED     BP: (!) 175/84  Pulse: 77  Resp: 16  Temp: 98.2 °F (36.8 °C)  SpO2: 100 % .     Physical exam noted above. DDx includes but is not limited to diverticular disease, colitis, intra-abdominal infection, UTI, kidney stones, bowel  obstruction. Also considered but clinically less likely to be sepsis, mesenteric ischemia, appendicitis. Will obtain labs and imaging including an abdominal pain workup. Will also provide IV pain medication and Zofran. Will continue to monitor and frequently reassess pending results of labs, treatments and final disposition.    Patient is aware of plan and is amenable.     Annie Albert D.O  EMERGENCY MEDICINE  7:04 AM 09/18/2022      This chart was completed using dictation software, as a result there may be some transcription errors         Scribe Attestation:   Scribe #1: I performed the above scribed service and the documentation accurately describes the services I performed. I attest to the accuracy of the note.            UPDATE  Labs unremarkable.  Given history and presentation, a CT abdomen pelvis was ordered to rule out bowel obstruction, other intra-abdominal infections. CT abdomen pelvis shows focal area inflammation in the pericolonic region and thickening of the wall in the mid descending colon just proximal to anastomosis with GI stable.  Findings concerning for acute diverticulitis.  No obvious perforation abscess.  On reassessment, patient states that her symptoms was unchanged.  She was given another dose of IV morphine.  On reassessment her symptoms were improved and she was able to tolerate p.o..  Through shared decision making with the patient, will discharge with pain medication, antibiotics, instructions for a liquid diet, general surgery follow-up and ED return precautions.      Annie Albert D.O  EMERGENCY MEDICINE   11:58 AM 09/18/2022         Clinical Impression:   Final diagnoses:  [K57.92] Diverticulitis (Primary)   Scribe attestation: I, Annie Albert, DO, personally performed the services described in this documentation. All medical record entries made by the scribe were at my direction and in my presence.  I have reviewed the chart and agree that the record  reflects my personal performance and is accurate and complete.    ED Disposition Condition    Discharge Stable          ED Prescriptions       Medication Sig Dispense Start Date End Date Auth. Provider    metroNIDAZOLE (FLAGYL) 500 MG tablet Take 1 tablet (500 mg total) by mouth 3 (three) times daily. for 7 days 21 tablet 9/18/2022 9/25/2022 Annie Albert,     ciprofloxacin HCl (CIPRO) 500 MG tablet Take 1 tablet (500 mg total) by mouth 2 (two) times daily. for 7 days 14 tablet 9/18/2022 9/25/2022 Annie Albert DO          Follow-up Information       Follow up With Specialties Details Why Contact Info    [facility] GENERAL SURGERY General Surgery Schedule an appointment as soon as possible for a visit on 9/20/2022 Emergency Room Follow-up [address] [facility] [address]  [phone]    [facility] Emergency Dept Emergency Medicine Go to  If symptoms worsen [address] [facility] [id number] [phone]             Annie Albert,   09/18/22 1207       Annie Albert,   09/18/22 1226

## 2023-04-25 ENCOUNTER — PATIENT MESSAGE (OUTPATIENT)
Dept: RESEARCH | Facility: HOSPITAL | Age: 53
End: 2023-04-25
Payer: MEDICAID

## 2023-05-09 ENCOUNTER — PATIENT MESSAGE (OUTPATIENT)
Dept: RESEARCH | Facility: HOSPITAL | Age: 53
End: 2023-05-09
Payer: COMMERCIAL

## 2023-06-30 ENCOUNTER — HOSPITAL ENCOUNTER (EMERGENCY)
Facility: HOSPITAL | Age: 53
Discharge: HOME | End: 2023-06-30
Attending: EMERGENCY MEDICINE
Payer: COMMERCIAL

## 2023-06-30 VITALS
WEIGHT: 158 LBS | DIASTOLIC BLOOD PRESSURE: 77 MMHG | BODY MASS INDEX: 29.83 KG/M2 | RESPIRATION RATE: 16 BRPM | TEMPERATURE: 99 F | HEIGHT: 61 IN | SYSTOLIC BLOOD PRESSURE: 135 MMHG | OXYGEN SATURATION: 96 % | HEART RATE: 56 BPM

## 2023-06-30 DIAGNOSIS — R07.9 CHEST PAIN: ICD-10-CM

## 2023-06-30 LAB
ALBUMIN SERPL BCP-MCNC: 4.1 G/DL (ref 3.5–5.2)
ALP SERPL-CCNC: 80 U/L (ref 55–135)
ALT SERPL W/O P-5'-P-CCNC: 13 U/L (ref 10–44)
ANION GAP SERPL CALC-SCNC: 12 MMOL/L (ref 8–16)
AST SERPL-CCNC: 17 U/L (ref 10–40)
BASOPHILS # BLD AUTO: 0.04 K/UL (ref 0–0.2)
BASOPHILS NFR BLD: 0.8 % (ref 0–1.9)
BILIRUB SERPL-MCNC: 1.4 MG/DL (ref 0.1–1)
BNP SERPL-MCNC: 52 PG/ML (ref 0–99)
BUN SERPL-MCNC: 10 MG/DL (ref 6–20)
CALCIUM SERPL-MCNC: 9.7 MG/DL (ref 8.7–10.5)
CHLORIDE SERPL-SCNC: 107 MMOL/L (ref 95–110)
CO2 SERPL-SCNC: 21 MMOL/L (ref 23–29)
CREAT SERPL-MCNC: 0.8 MG/DL (ref 0.5–1.4)
DIFFERENTIAL METHOD: NORMAL
EOSINOPHIL # BLD AUTO: 0.1 K/UL (ref 0–0.5)
EOSINOPHIL NFR BLD: 2.5 % (ref 0–8)
ERYTHROCYTE [DISTWIDTH] IN BLOOD BY AUTOMATED COUNT: 12.7 % (ref 11.5–14.5)
EST. GFR  (NO RACE VARIABLE): >60 ML/MIN/1.73 M^2
GLUCOSE SERPL-MCNC: 108 MG/DL (ref 70–110)
HCT VFR BLD AUTO: 40.8 % (ref 37–48.5)
HGB BLD-MCNC: 13.2 G/DL (ref 12–16)
IMM GRANULOCYTES # BLD AUTO: 0.01 K/UL (ref 0–0.04)
IMM GRANULOCYTES NFR BLD AUTO: 0.2 % (ref 0–0.5)
LYMPHOCYTES # BLD AUTO: 1.7 K/UL (ref 1–4.8)
LYMPHOCYTES NFR BLD: 35.8 % (ref 18–48)
MCH RBC QN AUTO: 28.3 PG (ref 27–31)
MCHC RBC AUTO-ENTMCNC: 32.4 G/DL (ref 32–36)
MCV RBC AUTO: 88 FL (ref 82–98)
MONOCYTES # BLD AUTO: 0.4 K/UL (ref 0.3–1)
MONOCYTES NFR BLD: 8.1 % (ref 4–15)
NEUTROPHILS # BLD AUTO: 2.5 K/UL (ref 1.8–7.7)
NEUTROPHILS NFR BLD: 52.6 % (ref 38–73)
NRBC BLD-RTO: 0 /100 WBC
PLATELET # BLD AUTO: 235 K/UL (ref 150–450)
PMV BLD AUTO: 10.4 FL (ref 9.2–12.9)
POTASSIUM SERPL-SCNC: 4.1 MMOL/L (ref 3.5–5.1)
PROT SERPL-MCNC: 7.7 G/DL (ref 6–8.4)
RBC # BLD AUTO: 4.66 M/UL (ref 4–5.4)
SODIUM SERPL-SCNC: 140 MMOL/L (ref 136–145)
TROPONIN I SERPL DL<=0.01 NG/ML-MCNC: <0.006 NG/ML (ref 0–0.03)
WBC # BLD AUTO: 4.83 K/UL (ref 3.9–12.7)

## 2023-06-30 PROCEDURE — 63600175 PHARM REV CODE 636 W HCPCS

## 2023-06-30 PROCEDURE — 96375 TX/PRO/DX INJ NEW DRUG ADDON: CPT

## 2023-06-30 PROCEDURE — 96374 THER/PROPH/DIAG INJ IV PUSH: CPT

## 2023-06-30 PROCEDURE — 85025 COMPLETE CBC W/AUTO DIFF WBC: CPT

## 2023-06-30 PROCEDURE — 93005 ELECTROCARDIOGRAM TRACING: CPT

## 2023-06-30 PROCEDURE — 93010 EKG 12-LEAD: ICD-10-PCS | Mod: ,,, | Performed by: INTERNAL MEDICINE

## 2023-06-30 PROCEDURE — 80053 COMPREHEN METABOLIC PANEL: CPT

## 2023-06-30 PROCEDURE — 99285 EMERGENCY DEPT VISIT HI MDM: CPT | Mod: 25

## 2023-06-30 PROCEDURE — 84484 ASSAY OF TROPONIN QUANT: CPT

## 2023-06-30 PROCEDURE — 25000003 PHARM REV CODE 250

## 2023-06-30 PROCEDURE — 83880 ASSAY OF NATRIURETIC PEPTIDE: CPT

## 2023-06-30 PROCEDURE — 93010 ELECTROCARDIOGRAM REPORT: CPT | Mod: ,,, | Performed by: INTERNAL MEDICINE

## 2023-06-30 RX ORDER — MORPHINE SULFATE 4 MG/ML
4 INJECTION, SOLUTION INTRAMUSCULAR; INTRAVENOUS
Status: COMPLETED | OUTPATIENT
Start: 2023-06-30 | End: 2023-06-30

## 2023-06-30 RX ORDER — ONDANSETRON 2 MG/ML
4 INJECTION INTRAMUSCULAR; INTRAVENOUS
Status: COMPLETED | OUTPATIENT
Start: 2023-06-30 | End: 2023-06-30

## 2023-06-30 RX ORDER — ASPIRIN 325 MG
325 TABLET ORAL
Status: COMPLETED | OUTPATIENT
Start: 2023-06-30 | End: 2023-06-30

## 2023-06-30 RX ADMIN — ONDANSETRON 4 MG: 2 INJECTION INTRAMUSCULAR; INTRAVENOUS at 09:06

## 2023-06-30 RX ADMIN — MORPHINE SULFATE 4 MG: 4 INJECTION INTRAVENOUS at 09:06

## 2023-06-30 RX ADMIN — ASPIRIN 325 MG ORAL TABLET 325 MG: 325 PILL ORAL at 09:06

## 2023-06-30 NOTE — ED TRIAGE NOTES
Patient reports intermittent left CW pain that radiates into the left arm, states pain 4/10 that has been ongoing for 3 days, states was really bad last night, then eased up then when driving to work this morning got intense again.

## 2023-06-30 NOTE — DISCHARGE INSTRUCTIONS
Diagnosis: chest pain    Tests you had showed: EKG and labs did not show a heart attack.    Home Care Instructions:  - Continue taking your home medications as prescribed    Take ibuprofen (also called Advil, Motrin) for your pain. This medicine is available over-the-counter in 200 mg tablets.  - You may take 600 mg every 6 hours, or 800 mg every 8 hours as needed   - Do not take more than this amount, as it can cause kidney problems, bleeding in your stomach, and other serious problems.   - Do not also take naproxen (Aleve) at the same time or on the same day  - If you have heart problems or uncontrolled high blood pressure, you should not take ibuprofen for more than 3 days without discussing with your doctor    If your pain is not controlled with ibuprofen, you may also take acetaminophen (also called Tylenol).  - You may take up to 1,000 mg of Tylenol every 6 hours as needed  - Do not take more than 4,000 mg in 24 hours (1 day) as this may cause liver damage  - Many other medicines include acetaminophen (Tylenol) such as: Norco, Vicodin, Tylenol #3, many cold medicines, etc.  - Please read all labels carefully and do not combine medicines that include acetaminophen.  - If you have a history of liver disease or drink alcohol heavily, do not take acetaminophen (Tylenol) since it can damage your liver    Follow-Up Plan:  - Follow-up with: Primary care doctor within 3 - 5 days  - Follow-up for additional testing and/or evaluation as directed by your primary doctor    Return to the Emergency Department for symptoms including but not limited to: worsening symptoms, shortness of breath or chest pain, vomiting with inability to hold down fluids, fevers greater than 100.4°F, dizziness, passing out/fainting/unconsciousness, or other concerning symptoms.

## 2023-06-30 NOTE — ED PROVIDER NOTES
Encounter Date: 6/30/2023       History     Chief Complaint   Patient presents with    Chest Pain     Pt to ER with reports of chest pain radiating down left arm on and off x 3 days. Pt reports periodic N/V.      Shital Duong is a 53 y.o. female with PMH of glaucoma, multiple abdominal surgeries, presenting to [facility] ED for chest pain.  Patient states that she is had intermittent chest pain for the last 2-3 days.  Reports that it is located on left side of her chest, characterized as chest tightness, radiates down her left arm.  No exacerbating or alleviating factors.  She has experienced this pain at rest.  She has associated nausea but no vomiting.  Denies shortness of this time.  Denies any fever, chills, cough, congestion.        Review of patient's allergies indicates:   Allergen Reactions    Aspirin     Hydrocodone Other (See Comments)    Hydromorphone     Vicryl [sutures, polyglycolic acid] Itching     Has needed early suture removal due to itching, edema, and granuloma formation with vicryl sutures.    Azithromycin Nausea And Vomiting     Past Medical History:   Diagnosis Date    Amblyopia     Cataract     Retinal detachment     Unspecified glaucoma      Past Surgical History:   Procedure Laterality Date    BREAST CYST EXCISION      BREAST SURGERY      CATARACT EXTRACTION      GASTRIC BYPASS      Gastric sleeve      IMPLANTATION OF DEVICE FOR GLAUCOMA Right 03/31/2022    Procedure: INSERTION, DEVICE, FOR GLAUCOMA/  RIGHT EYE;  Surgeon: Delma Chaves MD;  Location: [facility] OR [address] [place] [address];  Service: Ophthalmology;  Laterality: Right;    INSERTION OF BREAST IMPLANT      REDUCTION OF BOTH BREASTS      RETINAL DETACHMENT SURGERY       Family History   Problem Relation Age of Onset    Breast cancer Neg Hx     Colon cancer Neg Hx     Ovarian cancer Neg Hx     Amblyopia Neg Hx     Blindness Neg Hx     Cataracts Neg Hx     Glaucoma Neg Hx     Macular degeneration Neg Hx     Retinal detachment Neg Hx      Strabismus Neg Hx      Social History     Tobacco Use    Smoking status: Never    Smokeless tobacco: Never   Substance Use Topics    Alcohol use: Yes     Alcohol/week: 3.0 standard drinks     Types: 3 Shots of liquor per week    Drug use: Never     Review of Systems   Constitutional:  Negative for fever.   HENT:  Negative for congestion, rhinorrhea and sore throat.    Eyes:  Negative for visual disturbance.   Respiratory:  Negative for cough and shortness of breath.    Cardiovascular:  Positive for chest pain. Negative for leg swelling.   Gastrointestinal:  Positive for nausea. Negative for abdominal pain, diarrhea and vomiting.   Genitourinary:  Negative for dysuria and hematuria.   Neurological:  Negative for weakness.     Physical Exam     Initial Vitals [06/30/23 0842]   BP Pulse Resp Temp SpO2   (!) 179/81 70 18 98.7 °F (37.1 °C) 96 %      MAP       --         Physical Exam    Nursing note and vitals reviewed.  Constitutional: She appears well-developed and well-nourished. She is cooperative.  Non-toxic appearance.   HENT:   Head: Normocephalic and atraumatic.   Eyes: Conjunctivae and EOM are normal. Pupils are equal, round, and reactive to light.   Neck: Trachea normal and phonation normal. Neck supple. No tracheal deviation present.   Cardiovascular:  Normal rate, regular rhythm, normal heart sounds, intact distal pulses and normal pulses.     Exam reveals no gallop, no S3, no S4 and no friction rub.       No murmur heard.  Pulmonary/Chest: Breath sounds normal. No respiratory distress. She has no wheezes. She has no rhonchi. She has no rales.   Abdominal: Abdomen is soft. She exhibits no distension. There is no abdominal tenderness.   Musculoskeletal:      Cervical back: Neck supple.      Comments: Extremities atraumatic x4.  Normal gait.  No clubbing, cyanosis, edema.     Neurological: She is alert and oriented to person, place, and time. No cranial nerve deficit or sensory deficit. GCS eye subscore is 4.  GCS verbal subscore is 5. GCS motor subscore is 6.   Skin: Skin is warm, dry and intact. Capillary refill takes less than 2 seconds.   Psychiatric: She has a normal mood and affect. Her speech is normal and behavior is normal. Thought content normal.       ED Course   Procedures  Labs Reviewed   COMPREHENSIVE METABOLIC PANEL - Abnormal; Notable for the following components:       Result Value    CO2 21 (*)     Total Bilirubin 1.4 (*)     All other components within normal limits   CBC W/ AUTO DIFFERENTIAL   TROPONIN I   B-TYPE NATRIURETIC PEPTIDE     EKG Readings: (Independently Interpreted)   Initial Reading: No STEMI. Rhythm: Normal Sinus Rhythm. Heart Rate: 71. Ectopy: No Ectopy. Conduction: Normal. ST Segments: Normal ST Segments. T Waves Flipped: AVR and V1. Axis: Normal. Clinical Impression: Normal Sinus Rhythm     Imaging Results              X-Ray Chest AP Portable (Final result)  Result time 06/30/23 10:08:12      Final result by Castro Linda MD (06/30/23 10:08:12)                   Impression:      No acute chest disease identified.      Electronically signed by: Castro Linda MD  Date:    06/30/2023  Time:    10:08               Narrative:    EXAMINATION:  XR CHEST AP PORTABLE    CLINICAL HISTORY:  Chest Pain;    TECHNIQUE:  Single frontal view of the chest was performed.    COMPARISON:  None    FINDINGS:  The heart is not enlarged.  Superior mediastinal structures are unremarkable.  Pulmonary vasculature is within normal limits.  The lungs are free of focal consolidations.  There is no evidence for pneumothorax or large pleural effusions.  Bony structures are grossly intact.                                       Medications   ondansetron injection 4 mg (4 mg Intravenous Given 6/30/23 0938)   morphine injection 4 mg (4 mg Intravenous Given 6/30/23 0938)   aspirin tablet 325 mg (325 mg Oral Given 6/30/23 0938)     Medical Decision Making:   Initial Assessment:   53-year-old female presenting for new  left-sided chest pain.  Patient is hypertensive which she has no history of hypertension, otherwise hemodynamically stable, mentating at baseline.  Differential Diagnosis:   Cardiac ischemia, anemia, electrolyte abnormality, pneumonia, pneumothorax  Clinical Tests:   Lab Tests: Ordered and Reviewed  The following lab test(s) were unremarkable: CBC, CMP, Troponin and BNP  Radiological Study: Ordered and Reviewed  Medical Tests: Ordered and Reviewed  ED Management:  Patient presents with left-sided chest pain, new onset hypertension in the setting of no cardiac history patient's characterization of her chest pain is concerning for cardiac ischemia.    Treatments in the emergency department include aspirin for cardio protection, morphine for pain control.      Workup as detailed below in ED course.  EKG, BNP, troponin reassuring.  Since patient has had this pain for approximately 3 days, 2nd troponin is not indicated at this time.  Considered pulmonary embolism but patient is low risk based on Wells score.  Additionally, no history of unprovoked DVT estrogen use, long travel.  Considered aortic dissection but pain is intermittent, does not radiate to the back or abdomen.    Conducted shared decision-making with patient.  Offered observation if patient is concerned about going home.  Discussed that based on her medical history and workup she has a low risk of adverse cardiac events.  Patient states that she lives nearby and has her  and son at home.  Patient states that she feels comfortable going home and understands what signs to look out for to come back to the emergency department.  Provided strict patient is hemodynamically stable she is appropriate to go home at this time.  Provided referral to Cardiology.  Additionally, instructed patient to follow up with primary doctor in the next week.  Additional MDM:     Well's Criteria Score:  -Clinical symptoms of DVT (leg swelling, pain with palpation) =  0.0  -Other diagnosis less likely than pulmonary embolism =            0.0  -Heart Rate >100 =   0.0  -Immobilization (= or > than 3 days) or surgery in the previous 4 weeks = 0.0  -Previous DVT/PE = 0.0  -Hemoptysis =          0.0  -Malignancy =           0.0  Well's Probability Score =    0      Heart Score:    History:          Moderately suspicious.  ECG:             Normal  Age:               45-65 years  Risk factors: no risk factors known  Troponin:       Less than or equal to normal limit  Final Score: 2          ED Course as of 06/30/23 1239   Fri Jun 30, 2023   0940 CBC auto differential  WNL   [ES]   0952 BNP: 52 [ES]   0952 Troponin I: <0.006 [ES]   1011 X-Ray Chest AP Portable  Independent interpretation: No evidence of pneumonia, pneumothorax, pulmonary edema. [ES]   1011 BILIRUBIN TOTAL(!): 1.4 [ES]      ED Course User Index  [ES] Livia Jimenes MD                 Clinical Impression:   Final diagnoses:  [R07.9] Chest pain        ED Disposition Condition    Discharge Stable          ED Prescriptions    None       Follow-up Information       Follow up With Specialties Details Why Contact Info    Angel Luna MD Cardiovascular Disease, Interventional Cardiology, Cardiology In 3 days  [address]  [address]  [facility] [address]  [phone]      [facility] Dept Emergency Medicine  As needed [address] [facility] [address] [phone]    Albertina Kirby MD Internal Medicine In 1 week  [address] [facility] [address]  [phone]               Livia Jimenes MD  Resident  06/30/23 1249

## 2023-07-24 ENCOUNTER — PATIENT MESSAGE (OUTPATIENT)
Dept: RESEARCH | Facility: HOSPITAL | Age: 53
End: 2023-07-24
Payer: MEDICAID

## 2023-07-31 ENCOUNTER — PATIENT MESSAGE (OUTPATIENT)
Dept: RESEARCH | Facility: HOSPITAL | Age: 53
End: 2023-07-31
Payer: MEDICAID

## 2024-01-19 ENCOUNTER — HOSPITAL ENCOUNTER (EMERGENCY)
Facility: HOSPITAL | Age: 54
Discharge: HOME | End: 2024-01-19
Attending: EMERGENCY MEDICINE
Payer: COMMERCIAL

## 2024-01-19 VITALS
HEIGHT: 61 IN | HEART RATE: 67 BPM | DIASTOLIC BLOOD PRESSURE: 78 MMHG | WEIGHT: 150 LBS | SYSTOLIC BLOOD PRESSURE: 175 MMHG | TEMPERATURE: 98 F | OXYGEN SATURATION: 99 % | RESPIRATION RATE: 18 BRPM | BODY MASS INDEX: 28.32 KG/M2
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
## 2024-01-19 DIAGNOSIS — S40.021S ARM CONTUSION, RIGHT, SEQUELA: ICD-10-CM

## 2024-01-19 DIAGNOSIS — S70.01XA CONTUSION OF RIGHT HIP, INITIAL ENCOUNTER: ICD-10-CM

## 2024-01-19 PROCEDURE — 99284 EMERGENCY DEPT VISIT MOD MDM: CPT

## 2024-01-19 RX ORDER — SULINDAC 150 MG/1
150 TABLET ORAL 2 TIMES DAILY
Qty: 10 TABLET | Refills: 0 | Status: SHIPPED | OUTPATIENT
Start: 2024-01-19 | End: 2024-01-24

## 2024-01-19 RX ORDER — CYCLOBENZAPRINE HCL 10 MG
10 TABLET ORAL 3 TIMES DAILY PRN
Qty: 15 TABLET | Refills: 0 | Status: SHIPPED | OUTPATIENT
Start: 2024-01-19 | End: 2024-01-24

## 2024-01-19 NOTE — DISCHARGE INSTRUCTIONS
You have been prescribed clinoril (sulindac), an anti-inflammatory.  Take this medication whether you feel you need it or not.  Do not take ibuprofen, naproxen or other NSAID's medications while taking this medication. You have also been prescribed flexeril (cyclobenzaprine).  You have been given a medication that causes drowsiness.  Do not operate motor vehicles, drink alcohol, or operate heavy machinery while taking this medication. Return to the Emergency Department for any worsening, change in condition, or any emergent concerns.

## 2024-01-19 NOTE — ED PROVIDER NOTES
Encounter Date: 1/19/2024       History     Chief Complaint   Patient presents with    Hip Pain     Pt to ER with reports of right hip/ back pain s/p trip and fall 7 days ago. Pt denies LOC. No blood thinners.      Chief complaint:  Right hip and back pain    History of present illness:  Patient is a 54-year-old female who states that 7 days ago she was walking next to her bed when 1 of her dogs ran between her feet.  She fell onto her right side and arm but did not hit her head.  She denies numbness or tingling x4 extremities bowel or bladder incontinence.  Reports that she would bruising her right arm which has now healed but has continuous pain in the right hip and lower buttock that are constant and throbbing.  She reports movement and sitting long periods makes it worse.  She is taken Tylenol 1 g every 8 hours used ice and heat without relief.    The history is provided by the patient. No  was used.     Review of patient's allergies indicates:   Allergen Reactions    Aspirin     Hydrocodone Other (See Comments)    Hydromorphone     Vicryl [sutures, polyglycolic acid] Itching     Has needed early suture removal due to itching, edema, and granuloma formation with vicryl sutures.    Azithromycin Nausea And Vomiting     Past Medical History:   Diagnosis Date    Amblyopia     Cataract     Retinal detachment     Unspecified glaucoma      Past Surgical History:   Procedure Laterality Date    BREAST CYST EXCISION      BREAST SURGERY      CATARACT EXTRACTION      GASTRIC BYPASS      Gastric sleeve      IMPLANTATION OF DEVICE FOR GLAUCOMA Right 03/31/2022    Procedure: INSERTION, DEVICE, FOR GLAUCOMA/  RIGHT EYE;  Surgeon: Delma Chaves MD;  Location: [facility] OR [address] [place] [address];  Service: Ophthalmology;  Laterality: Right;    INSERTION OF BREAST IMPLANT      REDUCTION OF BOTH BREASTS      RETINAL DETACHMENT SURGERY       Family History   Problem Relation Age of Onset    Breast cancer Neg Hx     Colon  cancer Neg Hx     Ovarian cancer Neg Hx     Amblyopia Neg Hx     Blindness Neg Hx     Cataracts Neg Hx     Glaucoma Neg Hx     Macular degeneration Neg Hx     Retinal detachment Neg Hx     Strabismus Neg Hx      Social History     Tobacco Use    Smoking status: Never    Smokeless tobacco: Never   Substance Use Topics    Alcohol use: Yes     Alcohol/week: 3.0 standard drinks of alcohol     Types: 3 Shots of liquor per week    Drug use: Never     Review of Systems   Constitutional:  Negative for chills, fatigue and fever.   HENT:  Negative for congestion, ear discharge, ear pain, postnasal drip, rhinorrhea, sinus pressure, sneezing, sore throat and voice change.    Eyes:  Negative for discharge and itching.   Respiratory:  Negative for cough, shortness of breath and wheezing.    Cardiovascular:  Negative for chest pain, palpitations and leg swelling.   Gastrointestinal:  Negative for abdominal pain, constipation, diarrhea, nausea and vomiting.   Endocrine: Negative for polydipsia, polyphagia and polyuria.   Genitourinary:  Negative for dysuria, frequency, hematuria, urgency, vaginal bleeding, vaginal discharge and vaginal pain.   Musculoskeletal:  Positive for arthralgias and back pain. Negative for myalgias.   Skin:  Negative for rash and wound.   Neurological:  Negative for dizziness, seizures, syncope, weakness and numbness.   Hematological:  Negative for adenopathy. Does not bruise/bleed easily.   Psychiatric/Behavioral:  Negative for self-injury and suicidal ideas. The patient is not nervous/anxious.        Physical Exam     Initial Vitals [01/19/24 1314]   BP Pulse Resp Temp SpO2   (!) 175/78 67 18 98 °F (36.7 °C) 99 %      MAP       --         Physical Exam    Nursing note and vitals reviewed.  Constitutional: She appears well-developed and well-nourished.   HENT:   Head: Normocephalic and atraumatic.   Right Ear: External ear normal.   Left Ear: External ear normal.   Nose: Nose normal.   Eyes: Conjunctivae  and EOM are normal. Pupils are equal, round, and reactive to light. Right eye exhibits no discharge. Left eye exhibits no discharge.   Neck:   Normal range of motion.  Abdominal: She exhibits no distension.   Musculoskeletal:         General: Normal range of motion.      Cervical back: Normal range of motion.      Right hip: Normal.     Neurological: She is alert and oriented to person, place, and time.   Skin: Skin is dry. Capillary refill takes less than 2 seconds.         ED Course   Procedures  Labs Reviewed - No data to display       Imaging Results              X-Ray Hip 2 or 3 views Right (with Pelvis when performed) (Final result)  Result time 01/19/24 13:51:44      Final result by Himanshu Amaro DO (01/19/24 13:51:44)                   Impression:      Please see above      Electronically signed by: Himanshu Amaro DO  Date:    01/19/2024  Time:    13:51               Narrative:    EXAMINATION:  XR HIP WITH PELVIS WHEN PERFORMED, 2 OR 3  VIEWS RIGHT    CLINICAL HISTORY:  Unspecified fall, initial encounter    TECHNIQUE:  AP view of the pelvis and frog leg lateral view of the right hip were performed.    COMPARISON:  None    FINDINGS:  Bony pelvis grossly intact.  No evidence for acute fracture or dislocation right hip.  No focal bony erosion.  Few punctate probable phleboliths project over the pelvis.  Probable vascular calcifications right proximal thigh soft tissues.  Further evaluation as warranted clinically.                                       X-Ray Lumbar Spine Ap And Lateral (Final result)  Result time 01/19/24 13:52:09      Final result by Himanshu Amaro DO (01/19/24 13:52:09)                   Impression:      Please see above      Electronically signed by: Himanshu Amaro DO  Date:    01/19/2024  Time:    13:52               Narrative:    EXAMINATION:  XR LUMBAR SPINE AP AND LATERAL    CLINICAL HISTORY:  fall;    TECHNIQUE:  AP, lateral and spot images were performed of the lumbar  spine.    COMPARISON:  None    FINDINGS:  Lumbar sagittal alignment within normal limits.  Lumbar vertebral body heights and contours are within normal is without evidence for acute fracture or subluxation.  Punctate probable phleboliths project over the pelvis.  Surgical clips right upper abdomen likely post cholecystectomy.  Further evaluation as warranted clinically.                                       Medications - No data to display  Medical Decision Making  Patient is a 54-year-old female who states that 7 days ago she was walking next to her bed when 1 of her dogs ran between her feet.  She fell onto her right side and arm but did not hit her head.  She denies numbness or tingling x4 extremities bowel or bladder incontinence.  Reports that she would bruising her right arm which has now healed but has continuous pain in the right hip and lower buttock that are constant and throbbing.  She reports movement and sitting long periods makes it worse.  She is taken Tylenol 1 g every 8 hours used ice and heat without relief.    On physical examination she has full range of motion of the right hip ambulates with normal gait.  Denies numbness or tingling.  Differential diagnosis includes fracture dislocation sprain strain vertebral fracture.    Problems Addressed:  Arm contusion, right, sequela: self-limited or minor problem     Details: Patient reports resolved prior to visit today.  Contusion of right hip, initial encounter: acute illness or injury     Details: Apply heat, Clinoril and Flexeril prescribed follow up as directed.  Fall: acute illness or injury    Amount and/or Complexity of Data Reviewed  Radiology: ordered. Decision-making details documented in ED Course.  Discussion of management or test interpretation with external provider(s): Vital signs at the time of disposition were:  BP (!) 175/78 (BP Location: Right arm, Patient Position: Sitting)   Pulse 67   Temp 98 °F (36.7 °C) (Oral)   Resp 18   Ht  "5' 1" (1.549 m)   Wt 68 kg (150 lb)   LMP 04/13/2015   SpO2 99%   Breastfeeding No   BMI 28.34 kg/m²       See AVS for additional recommendations. Medications listed herein were prescribed after reviewing the patient's allergies, medication list, history, most recent laboratories as available.  Referrals below were provided after reviewing the patient's previous medical providers. She understands she  should return for any worsening or changes in condition.  Prior to discharge the patient was asked if she  had any additional concerns or complaints and she declined. The patient was given an opportunity to ask questions and all were answered to her satisfaction.     Risk  Prescription drug management.  Diagnosis or treatment significantly limited by social determinants of health.               ED Course as of 01/19/24 1543   Fri Jan 19, 2024   1352 BP(!): 175/78 [VC]   1352 Temp: 98 °F (36.7 °C) [VC]   1352 Temp Source: Oral [VC]   1352 Pulse: 67 [VC]   1352 Resp: 18 [VC]   1352 SpO2: 99 % [VC]   1400 X-Ray Lumbar Spine Ap And Lateral  Lumbar sagittal alignment within normal limits.  Lumbar vertebral body heights and contours are within normal is without evidence for acute fracture or subluxation.  Punctate probable phleboliths project over the pelvis.  Surgical clips right upper abdomen likely post cholecystectomy.  Further evaluation as warranted clinically. [VC]      ED Course User Index  [VC] Anthony Singer DNP                           Clinical Impression:  Final diagnoses:  [W19.XXXA] Fall (Primary)  [S70.01XA] Contusion of right hip, initial encounter  [S40.021S] Arm contusion, right, sequela          ED Disposition Condition    Discharge Stable          ED Prescriptions       Medication Sig Dispense Start Date End Date Auth. Provider    sulindac (CLINORIL) 150 MG tablet Take 1 tablet (150 mg total) by mouth 2 (two) times daily. for 5 days 10 tablet 1/19/2024 1/24/2024 Anthony Singer DNP    " cyclobenzaprine (FLEXERIL) 10 MG tablet Take 1 tablet (10 mg total) by mouth 3 (three) times daily as needed for Muscle spasms. 15 tablet 1/19/2024 1/24/2024 Anthony Singer DNP          Follow-up Information       Follow up With Specialties Details Why Contact Info    [facility] -  Schedule an appointment as soon as possible for a visit   [address]  [place] [address]  [phone]               Anthony Singer DNP  01/19/24 8924

## 2024-05-21 ENCOUNTER — HOSPITAL ENCOUNTER (EMERGENCY)
Facility: HOSPITAL | Age: 54
Discharge: HOME | End: 2024-05-21
Attending: STUDENT IN AN ORGANIZED HEALTH CARE EDUCATION/TRAINING PROGRAM
Payer: COMMERCIAL

## 2024-05-21 VITALS
RESPIRATION RATE: 18 BRPM | HEART RATE: 69 BPM | SYSTOLIC BLOOD PRESSURE: 148 MMHG | TEMPERATURE: 100 F | DIASTOLIC BLOOD PRESSURE: 72 MMHG | BODY MASS INDEX: 27.94 KG/M2 | WEIGHT: 148 LBS | HEIGHT: 61 IN | OXYGEN SATURATION: 97 %

## 2024-05-21 DIAGNOSIS — N12 PYELONEPHRITIS: Primary | ICD-10-CM

## 2024-05-21 LAB
BACTERIA #/AREA URNS HPF: ABNORMAL /HPF
BILIRUB UR QL STRIP: NEGATIVE
CLARITY UR: ABNORMAL
COLOR UR: YELLOW
GLUCOSE UR QL STRIP: NEGATIVE
HGB UR QL STRIP: ABNORMAL
HYALINE CASTS #/AREA URNS LPF: 0 /LPF
KETONES UR QL STRIP: NEGATIVE
LEUKOCYTE ESTERASE UR QL STRIP: ABNORMAL
MICROSCOPIC COMMENT: ABNORMAL
NITRITE UR QL STRIP: NEGATIVE
PH UR STRIP: 6 [PH] (ref 5–8)
PROT UR QL STRIP: ABNORMAL
RBC #/AREA URNS HPF: >100 /HPF (ref 0–4)
SP GR UR STRIP: 1.02 (ref 1–1.03)
URN SPEC COLLECT METH UR: ABNORMAL
UROBILINOGEN UR STRIP-ACNC: NEGATIVE EU/DL
WBC #/AREA URNS HPF: >100 /HPF (ref 0–5)
WBC CLUMPS URNS QL MICRO: ABNORMAL

## 2024-05-21 PROCEDURE — 25000003 PHARM REV CODE 250: Performed by: PHYSICIAN ASSISTANT

## 2024-05-21 PROCEDURE — 96372 THER/PROPH/DIAG INJ SC/IM: CPT | Performed by: PHYSICIAN ASSISTANT

## 2024-05-21 PROCEDURE — 87077 CULTURE AEROBIC IDENTIFY: CPT | Performed by: PHYSICIAN ASSISTANT

## 2024-05-21 PROCEDURE — 63600175 PHARM REV CODE 636 W HCPCS: Performed by: PHYSICIAN ASSISTANT

## 2024-05-21 PROCEDURE — 81000 URINALYSIS NONAUTO W/SCOPE: CPT | Performed by: PHYSICIAN ASSISTANT

## 2024-05-21 PROCEDURE — 87186 SC STD MICRODIL/AGAR DIL: CPT | Performed by: PHYSICIAN ASSISTANT

## 2024-05-21 PROCEDURE — 99284 EMERGENCY DEPT VISIT MOD MDM: CPT | Mod: 25

## 2024-05-21 PROCEDURE — 87086 URINE CULTURE/COLONY COUNT: CPT | Performed by: PHYSICIAN ASSISTANT

## 2024-05-21 PROCEDURE — 87088 URINE BACTERIA CULTURE: CPT | Performed by: PHYSICIAN ASSISTANT

## 2024-05-21 RX ORDER — OXYCODONE HYDROCHLORIDE 5 MG/1
5 TABLET ORAL
Status: COMPLETED | OUTPATIENT
Start: 2024-05-21 | End: 2024-05-21

## 2024-05-21 RX ORDER — CIPROFLOXACIN 500 MG/1
500 TABLET ORAL 2 TIMES DAILY
Qty: 14 TABLET | Refills: 0 | Status: SHIPPED | OUTPATIENT
Start: 2024-05-21 | End: 2024-05-28

## 2024-05-21 RX ORDER — KETOROLAC TROMETHAMINE 30 MG/ML
30 INJECTION, SOLUTION INTRAMUSCULAR; INTRAVENOUS
Status: COMPLETED | OUTPATIENT
Start: 2024-05-21 | End: 2024-05-21

## 2024-05-21 RX ORDER — ONDANSETRON 4 MG/1
4 TABLET, ORALLY DISINTEGRATING ORAL EVERY 6 HOURS PRN
Qty: 20 TABLET | Refills: 0 | Status: SHIPPED | OUTPATIENT
Start: 2024-05-21

## 2024-05-21 RX ORDER — OXYCODONE HYDROCHLORIDE 5 MG/1
5 TABLET ORAL EVERY 6 HOURS PRN
Qty: 12 TABLET | Refills: 0 | Status: SHIPPED | OUTPATIENT
Start: 2024-05-21

## 2024-05-21 RX ADMIN — OXYCODONE 5 MG: 5 TABLET ORAL at 02:05

## 2024-05-21 RX ADMIN — CEFTRIAXONE 1 G: 1 INJECTION, POWDER, FOR SOLUTION INTRAMUSCULAR; INTRAVENOUS at 02:05

## 2024-05-21 RX ADMIN — KETOROLAC TROMETHAMINE 30 MG: 30 INJECTION, SOLUTION INTRAMUSCULAR at 02:05

## 2024-05-21 NOTE — DISCHARGE INSTRUCTIONS
Take antibiotics as prescribed, try to take with meals to limit nausea.  Zofran as needed for continued nausea.  Continue with ibuprofen, Tylenol as needed for pain.  Oxycodone for severe or breakthrough pain. Be aware, this medication is sedating.  Do not mix with alcohol or any other sedating medications.  Do not drive or operate machinery when taking this medication.     Please return to this ED if you develop fever or chills, if worsening symptoms despite current treatment plan, if you begin with vomiting despite medications, if any other problems occur.

## 2024-05-21 NOTE — ED PROVIDER NOTES
Encounter Date: 5/21/2024       History     Chief Complaint   Patient presents with    Abdominal Pain    Back Pain     Pt presents to ER with complaints back pain, abdominal pain and painful urination. Pt rates pain 8/10 at this time. Pt states she has had kidney stones before. Pt states she is nauseated. Pt denies any other issues at this time.      53yo F presents to ED complaining of 4-5d history of worsening dysuria, lower abdominal pain.    States initially began with painful urination.  States symptoms have persisted over the past few days, now with left-sided low back pain, along with urgency.  Denies hematuria.  No strong odor to urine.  Denies frequent UTIs.  Pain has begun to radiate from her suprapubic region into her left flank and left-sided back.  Pain is constant.  She denies any alleviating or exacerbating factors.  Denies any associated rash or trauma to the area.  Seen by OBGYN yesterday due to lower abdominal pain/pelvic discomfort, states had normal pelvic exam, normal evaluation.  She denies fever or chills.  Admits to very mild nausea at point, no emesis.  No change in appetite or intake.  Normal BMs.      PMH:  Insomnia  Remote hx NIDDM  DVT  OD glaucoma  OD retinal detachment  Anemia  Diverticulitis s/p bowel resection  Nephrolithiasis  HTN      Review of patient's allergies indicates:   Allergen Reactions    Aspirin     Hydrocodone Other (See Comments)    Hydromorphone     Vicryl [sutures, polyglycolic acid] Itching     Has needed early suture removal due to itching, edema, and granuloma formation with vicryl sutures.    Azithromycin Nausea And Vomiting     Past Medical History:   Diagnosis Date    Amblyopia     Cataract     Retinal detachment     Unspecified glaucoma      Past Surgical History:   Procedure Laterality Date    BREAST CYST EXCISION      BREAST SURGERY      CATARACT EXTRACTION      GASTRIC BYPASS      Gastric sleeve      IMPLANTATION OF DEVICE FOR GLAUCOMA Right 03/31/2022     Procedure: INSERTION, DEVICE, FOR GLAUCOMA/  RIGHT EYE;  Surgeon: Delma Chaves MD;  Location: [facility] OR [address] [place] [address];  Service: Ophthalmology;  Laterality: Right;    INSERTION OF BREAST IMPLANT      REDUCTION OF BOTH BREASTS      RETINAL DETACHMENT SURGERY       Family History   Problem Relation Name Age of Onset    Breast cancer Neg Hx      Colon cancer Neg Hx      Ovarian cancer Neg Hx      Amblyopia Neg Hx      Blindness Neg Hx      Cataracts Neg Hx      Glaucoma Neg Hx      Macular degeneration Neg Hx      Retinal detachment Neg Hx      Strabismus Neg Hx       Social History     Tobacco Use    Smoking status: Never    Smokeless tobacco: Never   Substance Use Topics    Alcohol use: Yes     Alcohol/week: 3.0 standard drinks of alcohol     Types: 3 Shots of liquor per week    Drug use: Never     Review of Systems   Constitutional:  Negative for appetite change, chills and fever.   Gastrointestinal:  Positive for abdominal pain and nausea. Negative for constipation, diarrhea and vomiting.   Genitourinary:  Positive for dysuria, flank pain and urgency. Negative for hematuria.   Musculoskeletal:  Positive for back pain. Negative for myalgias.   Skin:  Negative for rash.   Neurological:  Negative for syncope.       Physical Exam     Initial Vitals [05/21/24 0103]   BP Pulse Resp Temp SpO2   139/79 79 18 99.5 °F (37.5 °C) 99 %      MAP       --         Physical Exam    Nursing note and vitals reviewed.  Constitutional: She appears well-developed and well-nourished. She is not diaphoretic. No distress.   HENT:   Head: Normocephalic and atraumatic.   Eyes:   OD mydriasis   Neck: Neck supple.   Normal range of motion.  Cardiovascular:  Intact distal pulses.           Pulmonary/Chest: No respiratory distress.   Abdominal: Abdomen is soft. Bowel sounds are normal. She exhibits no distension.   Mild TTP suprapubic abdomen.  Tenderness to left flank, to left CVA region.   Musculoskeletal:         General: No tenderness.  Normal range of motion.      Cervical back: Normal range of motion and neck supple.     Neurological: She is alert and oriented to person, place, and time.   Skin: Skin is warm.   Psychiatric: She has a normal mood and affect. Thought content normal.         ED Course   Procedures  Labs Reviewed   URINALYSIS, REFLEX TO URINE CULTURE - Abnormal; Notable for the following components:       Result Value    Appearance, UA Hazy (*)     Protein, UA 1+ (*)     Occult Blood UA 3+ (*)     Leukocytes, UA 3+ (*)     All other components within normal limits    Narrative:     Specimen Source->Urine   URINALYSIS MICROSCOPIC - Abnormal; Notable for the following components:    RBC, UA >100 (*)     WBC, UA >100 (*)     All other components within normal limits    Narrative:     Specimen Source->Urine   CULTURE, URINE          Imaging Results    None          Medications   cefTRIAXone (Rocephin) 1 g in LIDOcaine HCL 10 mg/ml (1%) 4 mL IM only syringe (1 g Intramuscular Given 5/21/24 0208)   ketorolac injection 30 mg (30 mg Intramuscular Given 5/21/24 0237)   oxyCODONE immediate release tablet 5 mg (5 mg Oral Given 5/21/24 0237)     Medical Decision Making  Differential diagnosis: Pyelonephritis, nephrolithiasis, infected stone, sepsis, zoster, PE, enteritis, colitis, diverticulosis, diverticulitis    Amount and/or Complexity of Data Reviewed  External Data Reviewed: notes.  Labs: ordered. Decision-making details documented in ED Course.  Discussion of management or test interpretation with external provider(s): No positive urine cultures on file.    Pain has been constant, pain has begun to radiate from the suprapubic abdomen into her left flank and left-sided back.  Low suspicion for nephrolithiasis at this time.  Denies fever chills.  Does admit to mild nausea, no emesis.  No change in appetite or intake.  Vitals reassuring.  No immunosuppression.  No peritoneal signs.  At this time, after discussion with patient and family, we  have agreed to trial outpatient treatment, given dose of Rocephin in the ED. low threshold for return if she begins with fever chills, worsening symptoms despite treatment.  Discussed red flags reasons for return.  Patient comfortable with plan.    Risk  Prescription drug management.                                      Clinical Impression:  Final diagnoses:  [N12] Pyelonephritis (Primary)          ED Disposition Condition    Discharge           ED Prescriptions       Medication Sig Dispense Start Date End Date Auth. Provider    ciprofloxacin HCl (CIPRO) 500 MG tablet Take 1 tablet (500 mg total) by mouth 2 (two) times daily. for 7 days 14 tablet 5/21/2024 5/28/2024 Alex Barclay PA-C    ondansetron (ZOFRAN-ODT) 4 MG TbDL Take 1 tablet (4 mg total) by mouth every 6 (six) hours as needed (nausea). 20 tablet 5/21/2024 -- Alex Barclay PA-C    oxyCODONE (ROXICODONE) 5 MG immediate release tablet Take 1 tablet (5 mg total) by mouth every 6 (six) hours as needed for Pain (severe pain). 12 tablet 5/21/2024 -- Alex Barclay PA-C          Follow-up Information       Follow up With Specialties Details Why Contact Info    Albertina Kirby MD Internal Medicine Schedule an appointment as soon as possible for a visit  For reevaluation, If symptoms persist [address] [place] [id number]  [phone]               Alex Barclay PA-C  05/21/24 1178

## 2024-05-23 LAB — BACTERIA UR CULT: ABNORMAL

## 2024-09-28 ENCOUNTER — PATIENT MESSAGE (OUTPATIENT)
Dept: OPHTHALMOLOGY | Facility: CLINIC | Age: 54
End: 2024-09-28
Payer: COMMERCIAL

## (undated) DEVICE — SYRINGE 30CC LL W/O NDL

## (undated) DEVICE — KNIFE ANGLE 1MM

## (undated) DEVICE — SYR DISP LL 5CC

## (undated) DEVICE — DRAPE STERI INCISE MED 130X130

## (undated) DEVICE — CONTAINER SPECIMEN STRL 4OZ

## (undated) DEVICE — KIT GREY EYE

## (undated) DEVICE — TRAY MUSCLE LID EYE

## (undated) DEVICE — NEEDLE HYPODERMIC HUB LUER LOC

## (undated) DEVICE — CLOSURE SKIN STERI STRIP 1/2X4

## (undated) DEVICE — SYR 1CC TB SG 27GX1/2

## (undated) DEVICE — SOL BALANCED SALT 500ML

## (undated) DEVICE — KNIFE OPHTH MICRO UNITOME 5MM

## (undated) DEVICE — PACK TOTAL PLUS 25G VITRECTOMY

## (undated) DEVICE — SUT 7/0 18IN COATED VICRYL

## (undated) DEVICE — SOL WATER STRL IRR 1000ML

## (undated) DEVICE — PROBE ILLUM FLEX CURVE LASER

## (undated) DEVICE — CORD BIPOLAR 12 FOOT

## (undated) DEVICE — NDL BOX COUNTER

## (undated) DEVICE — SYR 10CC LUER LOCK

## (undated) DEVICE — SEE MEDLINE ITEM 157131

## (undated) DEVICE — DRESSING EYE OVAL LF

## (undated) DEVICE — SUT CTD VICRYL 7-0 TG1408T

## (undated) DEVICE — FORCEP GRIESHABER MAXGRIP 25G

## (undated) DEVICE — KIT PERFLUOROCARBON LIQUID

## (undated) DEVICE — KIT MEROCEL INSTRUMENT WIPE

## (undated) DEVICE — SHEILD & GARTERS FOX METAL EYE

## (undated) DEVICE — NDL 22GA X1 1/2 REG BEVEL

## (undated) DEVICE — COVER MAYO STAND REINFRCD 30

## (undated) DEVICE — SEE MEDLINE ITEM 146372

## (undated) DEVICE — SHIELD COLLAGEN 12HR CORNEAL

## (undated) DEVICE — SHIELD EYE PLASTIC CLEAR ADLT

## (undated) DEVICE — PAD EYE OVAL CNTOUR 1.62X2.62

## (undated) DEVICE — PACK INSTRUMENT COVER DISPO

## (undated) DEVICE — SOL BETADINE 5%

## (undated) DEVICE — GOWN SURGICAL X-LARGE

## (undated) DEVICE — CORD FOR BIPOLAR FORCEPS 12

## (undated) DEVICE — LENS VITRCTMY OPHTH 30DEG 59DE

## (undated) DEVICE — NDL FLTR 5MCRN BLNT TIP 18GX1

## (undated) DEVICE — ERASER HEMSTAT BPLR 23G BLUNT

## (undated) DEVICE — FORCEP GRASPING 25GA SMOOTH

## (undated) DEVICE — SOL BSS BALANCED SALT

## (undated) DEVICE — SOL GONAK

## (undated) DEVICE — BACKFLUSH 25GA SOFT-TIP DISP

## (undated) DEVICE — SPONGE WEC CEL SPEARS